# Patient Record
Sex: FEMALE | Race: WHITE | NOT HISPANIC OR LATINO | Employment: FULL TIME | RURAL
[De-identification: names, ages, dates, MRNs, and addresses within clinical notes are randomized per-mention and may not be internally consistent; named-entity substitution may affect disease eponyms.]

---

## 2019-07-15 ENCOUNTER — HISTORICAL (OUTPATIENT)
Dept: ADMINISTRATIVE | Facility: HOSPITAL | Age: 19
End: 2019-07-15

## 2019-07-17 LAB
LAB AP GENERAL CAT - HISTORICAL: NORMAL
LAB AP INTERPRETATION/RESULT - HISTORICAL: NEGATIVE
LAB AP SPECIMEN ADEQUACY - HISTORICAL: NORMAL
LAB AP SPECIMEN SUBMITTED - HISTORICAL: NORMAL

## 2021-08-31 ENCOUNTER — OFFICE VISIT (OUTPATIENT)
Dept: FAMILY MEDICINE | Facility: CLINIC | Age: 21
End: 2021-08-31
Payer: COMMERCIAL

## 2021-08-31 VITALS
HEART RATE: 103 BPM | TEMPERATURE: 99 F | WEIGHT: 266 LBS | OXYGEN SATURATION: 96 % | BODY MASS INDEX: 45.41 KG/M2 | RESPIRATION RATE: 20 BRPM | SYSTOLIC BLOOD PRESSURE: 110 MMHG | HEIGHT: 64 IN | DIASTOLIC BLOOD PRESSURE: 70 MMHG

## 2021-08-31 DIAGNOSIS — R05.8 COUGH WITH EXPOSURE TO SEVERE ACUTE RESPIRATORY SYNDROME CORONAVIRUS 2 (SARS-COV-2): Primary | ICD-10-CM

## 2021-08-31 DIAGNOSIS — Z20.822 COUGH WITH EXPOSURE TO SEVERE ACUTE RESPIRATORY SYNDROME CORONAVIRUS 2 (SARS-COV-2): Primary | ICD-10-CM

## 2021-08-31 LAB
CTP QC/QA: YES
FLUAV AG NPH QL: NEGATIVE
FLUBV AG NPH QL: NEGATIVE
SARS-COV-2 AG RESP QL IA.RAPID: POSITIVE

## 2021-08-31 PROCEDURE — 1160F RVW MEDS BY RX/DR IN RCRD: CPT | Mod: CPTII,,, | Performed by: NURSE PRACTITIONER

## 2021-08-31 PROCEDURE — 1159F MED LIST DOCD IN RCRD: CPT | Mod: CPTII,,, | Performed by: NURSE PRACTITIONER

## 2021-08-31 PROCEDURE — 3074F SYST BP LT 130 MM HG: CPT | Mod: CPTII,,, | Performed by: NURSE PRACTITIONER

## 2021-08-31 PROCEDURE — 3078F PR MOST RECENT DIASTOLIC BLOOD PRESSURE < 80 MM HG: ICD-10-PCS | Mod: CPTII,,, | Performed by: NURSE PRACTITIONER

## 2021-08-31 PROCEDURE — 3074F PR MOST RECENT SYSTOLIC BLOOD PRESSURE < 130 MM HG: ICD-10-PCS | Mod: CPTII,,, | Performed by: NURSE PRACTITIONER

## 2021-08-31 PROCEDURE — 87428 SARSCOV & INF VIR A&B AG IA: CPT | Mod: QW,,, | Performed by: NURSE PRACTITIONER

## 2021-08-31 PROCEDURE — 99213 OFFICE O/P EST LOW 20 MIN: CPT | Mod: ,,, | Performed by: NURSE PRACTITIONER

## 2021-08-31 PROCEDURE — 3008F BODY MASS INDEX DOCD: CPT | Mod: CPTII,,, | Performed by: NURSE PRACTITIONER

## 2021-08-31 PROCEDURE — 87428 POCT SARS-COV2 (COVID) WITH FLU ANTIGEN: ICD-10-PCS | Mod: QW,,, | Performed by: NURSE PRACTITIONER

## 2021-08-31 PROCEDURE — 3078F DIAST BP <80 MM HG: CPT | Mod: CPTII,,, | Performed by: NURSE PRACTITIONER

## 2021-08-31 PROCEDURE — 1160F PR REVIEW ALL MEDS BY PRESCRIBER/CLIN PHARMACIST DOCUMENTED: ICD-10-PCS | Mod: CPTII,,, | Performed by: NURSE PRACTITIONER

## 2021-08-31 PROCEDURE — 1159F PR MEDICATION LIST DOCUMENTED IN MEDICAL RECORD: ICD-10-PCS | Mod: CPTII,,, | Performed by: NURSE PRACTITIONER

## 2021-08-31 PROCEDURE — 3008F PR BODY MASS INDEX (BMI) DOCUMENTED: ICD-10-PCS | Mod: CPTII,,, | Performed by: NURSE PRACTITIONER

## 2021-08-31 PROCEDURE — 99213 PR OFFICE/OUTPT VISIT, EST, LEVL III, 20-29 MIN: ICD-10-PCS | Mod: ,,, | Performed by: NURSE PRACTITIONER

## 2021-09-03 ENCOUNTER — TELEPHONE (OUTPATIENT)
Dept: FAMILY MEDICINE | Facility: CLINIC | Age: 21
End: 2021-09-03

## 2021-09-03 DIAGNOSIS — J02.9 PHARYNGITIS, UNSPECIFIED ETIOLOGY: Primary | ICD-10-CM

## 2021-09-03 RX ORDER — AZITHROMYCIN 250 MG/1
TABLET, FILM COATED ORAL
Qty: 6 TABLET | Refills: 0 | Status: SHIPPED | OUTPATIENT
Start: 2021-09-03 | End: 2023-08-09

## 2023-08-09 ENCOUNTER — OFFICE VISIT (OUTPATIENT)
Dept: FAMILY MEDICINE | Facility: CLINIC | Age: 23
End: 2023-08-09
Payer: MEDICAID

## 2023-08-09 VITALS
HEART RATE: 85 BPM | HEIGHT: 64 IN | DIASTOLIC BLOOD PRESSURE: 80 MMHG | SYSTOLIC BLOOD PRESSURE: 122 MMHG | TEMPERATURE: 97 F | BODY MASS INDEX: 44.66 KG/M2 | WEIGHT: 261.63 LBS | OXYGEN SATURATION: 99 % | RESPIRATION RATE: 19 BRPM

## 2023-08-09 DIAGNOSIS — M62.838 NECK MUSCLE SPASM: Primary | ICD-10-CM

## 2023-08-09 PROCEDURE — 99213 PR OFFICE/OUTPT VISIT, EST, LEVL III, 20-29 MIN: ICD-10-PCS | Mod: ,,, | Performed by: FAMILY MEDICINE

## 2023-08-09 PROCEDURE — 99213 OFFICE O/P EST LOW 20 MIN: CPT | Mod: ,,, | Performed by: FAMILY MEDICINE

## 2023-08-09 RX ORDER — TIZANIDINE 4 MG/1
TABLET ORAL
Qty: 60 TABLET | Refills: 2 | Status: SHIPPED | OUTPATIENT
Start: 2023-08-09

## 2023-08-09 RX ORDER — LISDEXAMFETAMINE DIMESYLATE 60 MG/1
60 CAPSULE ORAL EVERY MORNING
COMMUNITY

## 2023-08-09 NOTE — PROGRESS NOTES
Subjective     Patient ID: Jaida Campos is a 23 y.o. female.    Chief Complaint: neck spasms (On the right side of neck since yesterday. Cant move neck.)    Recurrent problem.  Patient has never been this bad.  Patient says she was doing a little work and had the sudden onset of pain in right side of her neck.  No radicular symptoms.  No fever.    Review of Systems       Objective     Physical Exam  Constitutional:       General: She is in acute distress.      Appearance: She is not ill-appearing.   Cardiovascular:      Rate and Rhythm: Normal rate and regular rhythm.   Musculoskeletal:      Cervical back: Spasms and tenderness present. No swelling. Normal range of motion.        Back:       Comments: Well localized tenderness in area noted above   Skin:     Findings: No lesion or rash.   Neurological:      Mental Status: She is alert.          Assessment and Plan     1. Neck muscle spasm    Other orders  -     tiZANidine (ZANAFLEX) 4 MG tablet; 1 or 2 3 times a day as needed for muscle spasm  Dispense: 60 tablet; Refill: 2        Tizanidine.  Call for any new symptoms.         No follow-ups on file.

## 2023-08-09 NOTE — LETTER
August 9, 2023      [facility]  [address] [facility] [place] [address]  Phone: [phone]  Fax: [phone]       Patient: Jaida Campos   YOB: 2000  Date of Visit: 08/09/2023    To Whom It May Concern:    JADE Campos  was at [facility] on 08/09/2023. The patient may return to work/school on 08/11/2023 with no restrictions. If you have any questions or concerns, or if I can be of further assistance, please do not hesitate to contact me.    Sincerely,    Kalyani Ness LPN

## 2023-10-04 ENCOUNTER — OFFICE VISIT (OUTPATIENT)
Dept: OBSTETRICS AND GYNECOLOGY | Facility: CLINIC | Age: 23
End: 2023-10-04
Payer: MEDICAID

## 2023-10-04 VITALS
HEIGHT: 64 IN | DIASTOLIC BLOOD PRESSURE: 70 MMHG | BODY MASS INDEX: 45.24 KG/M2 | WEIGHT: 265 LBS | TEMPERATURE: 98 F | HEART RATE: 97 BPM | SYSTOLIC BLOOD PRESSURE: 122 MMHG | RESPIRATION RATE: 17 BRPM

## 2023-10-04 DIAGNOSIS — Z34.01 ENCOUNTER FOR SUPERVISION OF NORMAL FIRST PREGNANCY IN FIRST TRIMESTER: Primary | ICD-10-CM

## 2023-10-04 DIAGNOSIS — Z3A.13 13 WEEKS GESTATION OF PREGNANCY: ICD-10-CM

## 2023-10-04 LAB
BILIRUB SERPL-MCNC: NORMAL MG/DL
BLOOD URINE, POC: NORMAL
COLOR, POC UA: NORMAL
GLUCOSE UR QL STRIP: NORMAL
KETONES UR QL STRIP: NORMAL
LEUKOCYTE ESTERASE URINE, POC: NORMAL
NITRITE, POC UA: NORMAL
PH, POC UA: 6
PROTEIN, POC: NORMAL
SPECIFIC GRAVITY, POC UA: 1.02
UROBILINOGEN, POC UA: 0.2

## 2023-10-04 PROCEDURE — 80307 DRUG TEST PRSMV CHEM ANLYZR: CPT | Mod: 90,,, | Performed by: CLINICAL MEDICAL LABORATORY

## 2023-10-04 PROCEDURE — 3078F DIAST BP <80 MM HG: CPT | Mod: CPTII,,, | Performed by: STUDENT IN AN ORGANIZED HEALTH CARE EDUCATION/TRAINING PROGRAM

## 2023-10-04 PROCEDURE — 99214 OFFICE O/P EST MOD 30 MIN: CPT | Mod: PBBFAC | Performed by: STUDENT IN AN ORGANIZED HEALTH CARE EDUCATION/TRAINING PROGRAM

## 2023-10-04 PROCEDURE — 87591 CHLAMYDIA/GONORRHOEAE(GC), PCR: ICD-10-PCS | Mod: ,,, | Performed by: CLINICAL MEDICAL LABORATORY

## 2023-10-04 PROCEDURE — 3008F PR BODY MASS INDEX (BMI) DOCUMENTED: ICD-10-PCS | Mod: CPTII,,, | Performed by: STUDENT IN AN ORGANIZED HEALTH CARE EDUCATION/TRAINING PROGRAM

## 2023-10-04 PROCEDURE — 87591 N.GONORRHOEAE DNA AMP PROB: CPT | Mod: ,,, | Performed by: CLINICAL MEDICAL LABORATORY

## 2023-10-04 PROCEDURE — 87491 CHLMYD TRACH DNA AMP PROBE: CPT | Mod: ,,, | Performed by: CLINICAL MEDICAL LABORATORY

## 2023-10-04 PROCEDURE — G0480 PR DRUG TEST DEF 1-7 CLASSES: ICD-10-PCS | Mod: 90,59,, | Performed by: CLINICAL MEDICAL LABORATORY

## 2023-10-04 PROCEDURE — 81003 URINALYSIS AUTO W/O SCOPE: CPT | Mod: PBBFAC | Performed by: STUDENT IN AN ORGANIZED HEALTH CARE EDUCATION/TRAINING PROGRAM

## 2023-10-04 PROCEDURE — 87186 CULTURE, URINE: ICD-10-PCS | Mod: ,,, | Performed by: CLINICAL MEDICAL LABORATORY

## 2023-10-04 PROCEDURE — 99204 OFFICE O/P NEW MOD 45 MIN: CPT | Mod: S$PBB,TH,, | Performed by: STUDENT IN AN ORGANIZED HEALTH CARE EDUCATION/TRAINING PROGRAM

## 2023-10-04 PROCEDURE — 99204 PR OFFICE/OUTPT VISIT, NEW, LEVL IV, 45-59 MIN: ICD-10-PCS | Mod: S$PBB,TH,, | Performed by: STUDENT IN AN ORGANIZED HEALTH CARE EDUCATION/TRAINING PROGRAM

## 2023-10-04 PROCEDURE — 3074F PR MOST RECENT SYSTOLIC BLOOD PRESSURE < 130 MM HG: ICD-10-PCS | Mod: CPTII,,, | Performed by: STUDENT IN AN ORGANIZED HEALTH CARE EDUCATION/TRAINING PROGRAM

## 2023-10-04 PROCEDURE — 87661 TRICHOMONAS VAGINALIS AMPLIF: CPT | Mod: ,,, | Performed by: CLINICAL MEDICAL LABORATORY

## 2023-10-04 PROCEDURE — 99999PBSHW POCT URINALYSIS W/O SCOPE: ICD-10-PCS | Mod: PBBFAC,,,

## 2023-10-04 PROCEDURE — 87077 CULTURE, URINE: ICD-10-PCS | Mod: ,,, | Performed by: CLINICAL MEDICAL LABORATORY

## 2023-10-04 PROCEDURE — 87661 TRICHOMONAS VAGINALIS BY PCR: ICD-10-PCS | Mod: ,,, | Performed by: CLINICAL MEDICAL LABORATORY

## 2023-10-04 PROCEDURE — G0480 DRUG TEST DEF 1-7 CLASSES: HCPCS | Mod: 90,59,, | Performed by: CLINICAL MEDICAL LABORATORY

## 2023-10-04 PROCEDURE — 1159F MED LIST DOCD IN RCRD: CPT | Mod: CPTII,,, | Performed by: STUDENT IN AN ORGANIZED HEALTH CARE EDUCATION/TRAINING PROGRAM

## 2023-10-04 PROCEDURE — 87077 CULTURE AEROBIC IDENTIFY: CPT | Mod: ,,, | Performed by: CLINICAL MEDICAL LABORATORY

## 2023-10-04 PROCEDURE — 3008F BODY MASS INDEX DOCD: CPT | Mod: CPTII,,, | Performed by: STUDENT IN AN ORGANIZED HEALTH CARE EDUCATION/TRAINING PROGRAM

## 2023-10-04 PROCEDURE — 3078F PR MOST RECENT DIASTOLIC BLOOD PRESSURE < 80 MM HG: ICD-10-PCS | Mod: CPTII,,, | Performed by: STUDENT IN AN ORGANIZED HEALTH CARE EDUCATION/TRAINING PROGRAM

## 2023-10-04 PROCEDURE — 80307 MAYO GENERIC ORDERABLE: ICD-10-PCS | Mod: 90,,, | Performed by: CLINICAL MEDICAL LABORATORY

## 2023-10-04 PROCEDURE — 3074F SYST BP LT 130 MM HG: CPT | Mod: CPTII,,, | Performed by: STUDENT IN AN ORGANIZED HEALTH CARE EDUCATION/TRAINING PROGRAM

## 2023-10-04 PROCEDURE — 99999PBSHW POCT URINALYSIS W/O SCOPE: Mod: PBBFAC,,,

## 2023-10-04 PROCEDURE — 87086 CULTURE, URINE: ICD-10-PCS | Mod: ,,, | Performed by: CLINICAL MEDICAL LABORATORY

## 2023-10-04 PROCEDURE — 87186 SC STD MICRODIL/AGAR DIL: CPT | Mod: ,,, | Performed by: CLINICAL MEDICAL LABORATORY

## 2023-10-04 PROCEDURE — 87491 CHLAMYDIA/GONORRHOEAE(GC), PCR: ICD-10-PCS | Mod: ,,, | Performed by: CLINICAL MEDICAL LABORATORY

## 2023-10-04 PROCEDURE — 87086 URINE CULTURE/COLONY COUNT: CPT | Mod: ,,, | Performed by: CLINICAL MEDICAL LABORATORY

## 2023-10-04 PROCEDURE — 1159F PR MEDICATION LIST DOCUMENTED IN MEDICAL RECORD: ICD-10-PCS | Mod: CPTII,,, | Performed by: STUDENT IN AN ORGANIZED HEALTH CARE EDUCATION/TRAINING PROGRAM

## 2023-10-04 RX ORDER — TERCONAZOLE 8 MG/G
1 CREAM VAGINAL NIGHTLY
Qty: 20 G | Refills: 0 | Status: SHIPPED | OUTPATIENT
Start: 2023-10-04

## 2023-10-04 NOTE — PROGRESS NOTES
CC: Positive Pregnancy Test    HISTORY OF PRESENT ILLNESS:    Jaida Zacarias is a 23 y.o. female, ,  Presents today for a routine exam complaining of amenorrhea and positive home urine pregnancy test.  Patient's last menstrual period was 2023.   She is not currently on any contraception.  Reports nausea. Reports breast tenderness. Denies vaginal bleeding and pelvic pain.      Review of patient's allergies indicates:  No Known Allergies    History reviewed. No pertinent past medical history.  History reviewed. No pertinent surgical history.  OB History          1    Para        Term                AB        Living             SAB        IAB        Ectopic        Multiple        Live Births                   Family History   Problem Relation Age of Onset    Stroke Maternal Grandfather     Hypertension Mother      Social History     Tobacco Use    Smoking status: Never    Smokeless tobacco: Never   Substance Use Topics    Alcohol use: Never    Drug use: Never       Current Outpatient Medications   Medication Sig    lisdexamfetamine (VYVANSE) 60 MG capsule Take 60 mg by mouth every morning.    terconazole (TERAZOL 3) 0.8 % vaginal cream Place 1 applicator vaginally every evening.    tiZANidine (ZANAFLEX) 4 MG tablet 1 or 2 3 times a day as needed for muscle spasm     No current facility-administered medications for this visit.       OB History    Para Term  AB Living   1             SAB IAB Ectopic Multiple Live Births                  # Outcome Date GA Lbr Saad/2nd Weight Sex Delivery Anes PTL Lv   1 Current                   ROS:  GENERAL: No weight changes. No swelling. No fatigue. No fever.  CARDIOVASCULAR: No chest pain. No shortness of breath. No leg cramps.   NEUROLOGICAL: No headaches. No vision changes.  BREASTS: No pain. No lumps. No discharge.  ABDOMEN: No pain. No diarrhea. No constipation.  REPRODUCTIVE: No abnormal bleeding.   VULVA: No pain. No lesions. No  "itching.  VAGINA: No relaxation. No itching. No odor. No discharge. No lesions.  URINARY: No incontinence. No nocturia. No frequency. No dysuria.    /70   Pulse 97   Temp 98.4 °F (36.9 °C)   Resp 17   Ht 5' 4" (1.626 m)   Wt 120.2 kg (265 lb)   LMP 06/29/2023   BMI 45.49 kg/m²     PE:  AFFECT: Calm, alert and oriented X 3. Interactive during exam  GENERAL: Appears well-nourished, well-developed, in no acute distress.  HEAD: Normocephalic, atruamatic  SKIN: Normal for race, warm, & dry. No lesions or rashes.  LUNGS: Easy and unlabored  ABDOMEN: Soft and nontender without masses or organomegally.  EXTREMITIES: No cyanosis, clubbing or edema. No calf tenderness.  LYMPH NODES: No axillary or inguinal adenopathy.      ASSESSMENT:       ICD-10-CM ICD-9-CM    1. Encounter for supervision of normal first pregnancy in first trimester  Z34.01 V22.0       2. 13 weeks gestation of pregnancy  Z3A.13 V22.2 CBC Auto Differential      Basic Metabolic Panel      Hepatitis B Surface Antigen      Rubella Antibody Screen      Treponema Pallidum (Syphillis) Antibody      Urine culture      POCT URINALYSIS W/O SCOPE      Type & Screen      HIV 1/2 Ag/Ab (4th Gen)      Chlamydia/GC, PCR      Hepatitis C Antibody      Trichomonas vaginalis by PCR      US OB <14 Wks, TransAbd, Single Gestation      [facility]: Controlled Substance Monitoring Panel, Random, Urine      CANCELED: OB Drug Screen Definitive, Urine             Plan:     Amenorrhea  Positive urine pregnancy test (SHERYL: 4/4/2024, EGA: 13 weeks based on LMP)    -  Routine prenatal care    Nausea and vomiting in pregnancy    -  Education regarding lifestyle and dietary modifications    -  Advised use of B6/Unisom. Pt will notify us if no relief/worsening symptoms, will consider Zofran if needed.      1st TRIMESTER COUNSELING: Discussed all, booklet provided:  Common complaints of pregnancy  HIV and other routine prenatal tests including "  genetic screening  Risk factors identified by prenatal history  Oriented to practice - discussed anticipated course of prenatal care & indications for Ultrasound  Childbirth classes/Hospital facilities   Nutrition and weight gain counseling  Toxoplasmosis precautions (Cats/Raw Meat)  Sexual activity and exercise  Environmental/Work hazards  Travel  Tobacco (Ask, Advise, Assess, Assist, and Arrange), as well as alcohol and drug use  Use of any medications (Including supplements, Vitamins, Herbs, or OTC Drugs)  Domestic violence  Seat belt use      TERATOLOGY COUNSELING:   Discussed indications and options for aneuploidy screening - pamphlets given    -  Pt desires Dolores testing.  Dating US ordered  Follow up in about 4 weeks (around 2023) for NEW OB.    David Rosado  OB/GYN

## 2023-10-05 ENCOUNTER — PATIENT MESSAGE (OUTPATIENT)
Dept: OBSTETRICS AND GYNECOLOGY | Facility: CLINIC | Age: 23
End: 2023-10-05
Payer: MEDICAID

## 2023-10-05 LAB
CHLAMYDIA BY PCR: NEGATIVE
N. GONORRHOEAE (GC) BY PCR: NEGATIVE
TRICHOMONAS NAT: NEGATIVE

## 2023-10-05 RX ORDER — CEPHALEXIN 500 MG/1
500 CAPSULE ORAL 4 TIMES DAILY
Qty: 28 CAPSULE | Refills: 0 | Status: SHIPPED | OUTPATIENT
Start: 2023-10-05 | End: 2023-10-12

## 2023-10-06 LAB — UA COMPLETE W REFLEX CULTURE PNL UR: ABNORMAL

## 2023-10-11 LAB — MAYO GENERIC ORDERABLE RESULT: NORMAL

## 2023-10-17 ENCOUNTER — HOSPITAL ENCOUNTER (OUTPATIENT)
Dept: RADIOLOGY | Facility: HOSPITAL | Age: 23
Discharge: HOME | End: 2023-10-17
Attending: STUDENT IN AN ORGANIZED HEALTH CARE EDUCATION/TRAINING PROGRAM
Payer: MEDICAID

## 2023-10-17 DIAGNOSIS — Z3A.13 13 WEEKS GESTATION OF PREGNANCY: ICD-10-CM

## 2023-10-17 PROCEDURE — 76801 OB US < 14 WKS SINGLE FETUS: CPT | Mod: 26,,, | Performed by: RADIOLOGY

## 2023-10-17 PROCEDURE — 76801 US OB <14 WEEKS TRANSABDOM, SINGLE GESTATION: ICD-10-PCS | Mod: 26,,, | Performed by: RADIOLOGY

## 2023-10-17 PROCEDURE — 76801 OB US < 14 WKS SINGLE FETUS: CPT | Mod: TC

## 2023-11-02 ENCOUNTER — ROUTINE PRENATAL (OUTPATIENT)
Dept: OBSTETRICS AND GYNECOLOGY | Facility: CLINIC | Age: 23
End: 2023-11-02
Payer: MEDICAID

## 2023-11-02 VITALS
WEIGHT: 270 LBS | DIASTOLIC BLOOD PRESSURE: 89 MMHG | BODY MASS INDEX: 46.35 KG/M2 | HEART RATE: 101 BPM | SYSTOLIC BLOOD PRESSURE: 135 MMHG

## 2023-11-02 DIAGNOSIS — O23.42 URINARY TRACT INFECTION IN MOTHER DURING SECOND TRIMESTER OF PREGNANCY: Primary | ICD-10-CM

## 2023-11-02 DIAGNOSIS — Z36.89 ENCOUNTER FOR FETAL ANATOMIC SURVEY: ICD-10-CM

## 2023-11-02 DIAGNOSIS — Z3A.18 18 WEEKS GESTATION OF PREGNANCY: ICD-10-CM

## 2023-11-02 LAB
BILIRUB SERPL-MCNC: NORMAL MG/DL
BLOOD URINE, POC: NORMAL
COLOR, POC UA: NORMAL
GLUCOSE UR QL STRIP: NORMAL
KETONES UR QL STRIP: NORMAL
LEUKOCYTE ESTERASE URINE, POC: NORMAL
NITRITE, POC UA: NORMAL
PH, POC UA: 6.5
PROTEIN, POC: NORMAL
SPECIFIC GRAVITY, POC UA: 1.02
UROBILINOGEN, POC UA: 0.2

## 2023-11-02 PROCEDURE — 99999PBSHW POCT URINALYSIS W/O SCOPE: Mod: PBBFAC,,,

## 2023-11-02 PROCEDURE — 99999PBSHW POCT URINALYSIS W/O SCOPE: ICD-10-PCS | Mod: PBBFAC,,,

## 2023-11-02 PROCEDURE — 99213 PR OFFICE/OUTPT VISIT, EST, LEVL III, 20-29 MIN: ICD-10-PCS | Mod: TH,S$PBB,, | Performed by: STUDENT IN AN ORGANIZED HEALTH CARE EDUCATION/TRAINING PROGRAM

## 2023-11-02 PROCEDURE — 99213 OFFICE O/P EST LOW 20 MIN: CPT | Mod: PBBFAC | Performed by: STUDENT IN AN ORGANIZED HEALTH CARE EDUCATION/TRAINING PROGRAM

## 2023-11-02 PROCEDURE — 81003 URINALYSIS AUTO W/O SCOPE: CPT | Mod: PBBFAC | Performed by: STUDENT IN AN ORGANIZED HEALTH CARE EDUCATION/TRAINING PROGRAM

## 2023-11-02 PROCEDURE — 99213 OFFICE O/P EST LOW 20 MIN: CPT | Mod: TH,S$PBB,, | Performed by: STUDENT IN AN ORGANIZED HEALTH CARE EDUCATION/TRAINING PROGRAM

## 2023-11-29 ENCOUNTER — ROUTINE PRENATAL (OUTPATIENT)
Dept: OBSTETRICS AND GYNECOLOGY | Facility: CLINIC | Age: 23
End: 2023-11-29
Payer: MEDICAID

## 2023-11-29 ENCOUNTER — HOSPITAL ENCOUNTER (OUTPATIENT)
Dept: RADIOLOGY | Facility: HOSPITAL | Age: 23
Discharge: HOME | End: 2023-11-29
Attending: STUDENT IN AN ORGANIZED HEALTH CARE EDUCATION/TRAINING PROGRAM
Payer: MEDICAID

## 2023-11-29 VITALS
WEIGHT: 274 LBS | SYSTOLIC BLOOD PRESSURE: 124 MMHG | HEART RATE: 89 BPM | BODY MASS INDEX: 47.03 KG/M2 | DIASTOLIC BLOOD PRESSURE: 85 MMHG

## 2023-11-29 DIAGNOSIS — R35.0 FREQUENCY OF URINATION: ICD-10-CM

## 2023-11-29 DIAGNOSIS — Z3A.21 21 WEEKS GESTATION OF PREGNANCY: ICD-10-CM

## 2023-11-29 DIAGNOSIS — Z36.89 ENCOUNTER FOR FETAL ANATOMIC SURVEY: ICD-10-CM

## 2023-11-29 DIAGNOSIS — O23.42 URINARY TRACT INFECTION IN MOTHER DURING SECOND TRIMESTER OF PREGNANCY: Primary | ICD-10-CM

## 2023-11-29 PROCEDURE — 99214 OFFICE O/P EST MOD 30 MIN: CPT | Mod: S$PBB,TH,, | Performed by: STUDENT IN AN ORGANIZED HEALTH CARE EDUCATION/TRAINING PROGRAM

## 2023-11-29 PROCEDURE — 76805 US OB 14+ WEEKS, TRANSABDOM, SINGLE GESTATION: ICD-10-PCS | Mod: 26,,, | Performed by: RADIOLOGY

## 2023-11-29 PROCEDURE — 99999PBSHW POCT URINALYSIS W/O SCOPE: ICD-10-PCS | Mod: PBBFAC,,,

## 2023-11-29 PROCEDURE — 76805 OB US >/= 14 WKS SNGL FETUS: CPT | Mod: TC

## 2023-11-29 PROCEDURE — 99214 PR OFFICE/OUTPT VISIT, EST, LEVL IV, 30-39 MIN: ICD-10-PCS | Mod: S$PBB,TH,, | Performed by: STUDENT IN AN ORGANIZED HEALTH CARE EDUCATION/TRAINING PROGRAM

## 2023-11-29 PROCEDURE — 87086 CULTURE, URINE: ICD-10-PCS | Mod: ,,, | Performed by: CLINICAL MEDICAL LABORATORY

## 2023-11-29 PROCEDURE — 87086 URINE CULTURE/COLONY COUNT: CPT | Mod: ,,, | Performed by: CLINICAL MEDICAL LABORATORY

## 2023-11-29 PROCEDURE — 76805 OB US >/= 14 WKS SNGL FETUS: CPT | Mod: 26,,, | Performed by: RADIOLOGY

## 2023-11-29 PROCEDURE — 81003 URINALYSIS AUTO W/O SCOPE: CPT | Mod: PBBFAC | Performed by: STUDENT IN AN ORGANIZED HEALTH CARE EDUCATION/TRAINING PROGRAM

## 2023-11-29 PROCEDURE — 99213 OFFICE O/P EST LOW 20 MIN: CPT | Mod: PBBFAC,25 | Performed by: STUDENT IN AN ORGANIZED HEALTH CARE EDUCATION/TRAINING PROGRAM

## 2023-11-29 PROCEDURE — 99999PBSHW POCT URINALYSIS W/O SCOPE: Mod: PBBFAC,,,

## 2023-11-29 NOTE — PROGRESS NOTES
Return OB Visit    23 y.o. female  at 18w6d   She denies any problems.   Reports some fetal movement or fluttering. Denies any vaginal bleeding, leakage of fluid, cramping, contractions, or pressure.   Total weight gain/weight loss in pregnancy: Not found.     Vitals  BP: 124/85  Pulse: 89  Weight: 124.3 kg (274 lb)  Prenatal  Fetal Heart Rate: 163  Movement: Present    Prenatal Labs:  Lab Results   Component Value Date    GROUPTRH A POS 10/04/2023    HGB 13.6 10/04/2023    HCT 39.7 10/04/2023     10/04/2023    HEPBSAG Non-Reactive 10/04/2023    [id number] Non-Reactive 10/04/2023    LABNGO Negative 10/04/2023    LABURIN >100,000 Escherichia coli (A) 10/04/2023       A: 18w6d           ICD-10-CM ICD-9-CM    1. Urinary tract infection in mother during second trimester of pregnancy  O23.42 646.63 Urine culture     599.0       2. Frequency of urination  R35.0 788.41 POCT URINALYSIS W/O SCOPE      3. 21 weeks gestation of pregnancy  Z3A.21 V22.2           P: Bleeding, daily fetal kick counts, and  labor/labor precautions discussed.    The following were addressed during this visit:    17-20 Weeks  - Quickening   - Lifestyle   - Ultrasound   - Importance of Early and Frequent Breastfeeding   - Baby-led Feeding   - Frequent feeding to help assure optimal milk production       Orders Placed This Encounter   Procedures    Urine culture    POCT URINALYSIS W/O SCOPE       Questions answered to desired level of satisfaction  Verbalized understanding to all information and instructions provided.  Follow up in about 4 weeks (around 2023) for OBV.    Alyse Boswell, DO OBGYN [facility]

## 2023-12-01 LAB — UA COMPLETE W REFLEX CULTURE PNL UR: NORMAL

## 2023-12-28 ENCOUNTER — ROUTINE PRENATAL (OUTPATIENT)
Dept: OBSTETRICS AND GYNECOLOGY | Facility: CLINIC | Age: 23
End: 2023-12-28
Payer: MEDICAID

## 2023-12-28 VITALS
DIASTOLIC BLOOD PRESSURE: 80 MMHG | BODY MASS INDEX: 48.41 KG/M2 | SYSTOLIC BLOOD PRESSURE: 120 MMHG | WEIGHT: 282 LBS | HEART RATE: 95 BPM

## 2023-12-28 DIAGNOSIS — R35.0 FREQUENCY OF URINATION: ICD-10-CM

## 2023-12-28 DIAGNOSIS — Z34.02 ENCOUNTER FOR SUPERVISION OF NORMAL FIRST PREGNANCY IN SECOND TRIMESTER: Primary | ICD-10-CM

## 2023-12-28 DIAGNOSIS — Z3A.23 23 WEEKS GESTATION OF PREGNANCY: ICD-10-CM

## 2023-12-28 LAB
BILIRUB SERPL-MCNC: NORMAL MG/DL
BLOOD URINE, POC: NORMAL
COLOR, POC UA: NORMAL
GLUCOSE UR QL STRIP: NORMAL
KETONES UR QL STRIP: NORMAL
LEUKOCYTE ESTERASE URINE, POC: NORMAL
NITRITE, POC UA: NORMAL
PH, POC UA: 5
PROTEIN, POC: NORMAL
SPECIFIC GRAVITY, POC UA: 1.02
UROBILINOGEN, POC UA: NORMAL

## 2023-12-28 PROCEDURE — 59425 ANTEPARTUM CARE ONLY: CPT | Mod: S$PBB,TH,, | Performed by: ADVANCED PRACTICE MIDWIFE

## 2023-12-28 PROCEDURE — 99213 OFFICE O/P EST LOW 20 MIN: CPT | Mod: PBBFAC | Performed by: ADVANCED PRACTICE MIDWIFE

## 2023-12-28 PROCEDURE — 59425 PR ANTEPARTUM CARE ONLY, 4-6 VISITS: ICD-10-PCS | Mod: S$PBB,TH,, | Performed by: ADVANCED PRACTICE MIDWIFE

## 2023-12-28 PROCEDURE — 99999PBSHW POCT URINALYSIS W/O SCOPE: Mod: PBBFAC,,,

## 2023-12-28 PROCEDURE — 81003 URINALYSIS AUTO W/O SCOPE: CPT | Mod: PBBFAC | Performed by: ADVANCED PRACTICE MIDWIFE

## 2023-12-28 PROCEDURE — 99999PBSHW POCT URINALYSIS W/O SCOPE: ICD-10-PCS | Mod: PBBFAC,,,

## 2023-12-28 NOTE — PROGRESS NOTES
23 y.o. female  at 23w0d   She c/o had stomach bug last week .   Denies any vaginal bleeding, leakage of fluid, cramping, contractions, or pressure.   Total weight gain/weight loss in pregnancy: 7.711 kg (17 lb)     Vitals  BP: 120/80  Pulse: 95  Weight: 127.9 kg (282 lb)  Prenatal  Fetal Heart Rate: 140  Movement: Present  Edema  LLE Edema: None  RLE Edema: None  Facial: None  Additional Edema?: No    Prenatal Labs:  Lab Results   Component Value Date    GROUPTRH A POS 10/04/2023    HGB 13.6 10/04/2023    HCT 39.7 10/04/2023     10/04/2023    HEPBSAG Non-Reactive 10/04/2023    [id number] Non-Reactive 10/04/2023    LABNGO Negative 10/04/2023    LABURIN Skin/Urogenital Teetee Isolated, no further workup. 2023       A: 23w0d           ICD-10-CM ICD-9-CM    1. Encounter for supervision of normal first pregnancy in second trimester  Z34.02 V22.0 POCT URINALYSIS W/O SCOPE      Treponema Pallidum (Syphillis) Antibody      CBC Auto Differential      Glucose, 1Hr Post Prandial      2. 23 weeks gestation of pregnancy  Z3A.23 V22.2 POCT URINALYSIS W/O SCOPE      3. Frequency of urination  R35.0 788.41 POCT URINALYSIS W/O SCOPE          P: Bleeding and  labor/labor precautions discussed.      Questions answered to desired level of satisfaction  Verbalized understanding to all information and instructions provided.  Follow up in about 4 weeks (around 2024) for OBV, 1hr GTT.    Mirlande Azul CNM, WHNP-BC

## 2024-01-23 ENCOUNTER — ROUTINE PRENATAL (OUTPATIENT)
Dept: OBSTETRICS AND GYNECOLOGY | Facility: CLINIC | Age: 24
End: 2024-01-23
Payer: MEDICAID

## 2024-01-23 VITALS
WEIGHT: 293 LBS | SYSTOLIC BLOOD PRESSURE: 128 MMHG | DIASTOLIC BLOOD PRESSURE: 80 MMHG | BODY MASS INDEX: 50.81 KG/M2 | HEART RATE: 90 BPM

## 2024-01-23 DIAGNOSIS — Z3A.26 26 WEEKS GESTATION OF PREGNANCY: ICD-10-CM

## 2024-01-23 DIAGNOSIS — Z34.02 ENCOUNTER FOR SUPERVISION OF NORMAL FIRST PREGNANCY IN SECOND TRIMESTER: Primary | ICD-10-CM

## 2024-01-23 DIAGNOSIS — R35.0 FREQUENCY OF URINATION: ICD-10-CM

## 2024-01-23 LAB
BILIRUB SERPL-MCNC: NORMAL MG/DL
BLOOD URINE, POC: NORMAL
COLOR, POC UA: YELLOW
GLUCOSE UR QL STRIP: NORMAL
KETONES UR QL STRIP: NORMAL
LEUKOCYTE ESTERASE URINE, POC: NORMAL
NITRITE, POC UA: NORMAL
PH, POC UA: 5
PROTEIN, POC: NORMAL
SPECIFIC GRAVITY, POC UA: 1.03
UROBILINOGEN, POC UA: NORMAL

## 2024-01-23 PROCEDURE — 99213 OFFICE O/P EST LOW 20 MIN: CPT | Mod: PBBFAC | Performed by: ADVANCED PRACTICE MIDWIFE

## 2024-01-23 PROCEDURE — 99999PBSHW POCT URINALYSIS W/O SCOPE: Mod: PBBFAC,,,

## 2024-01-23 PROCEDURE — 81003 URINALYSIS AUTO W/O SCOPE: CPT | Mod: PBBFAC | Performed by: ADVANCED PRACTICE MIDWIFE

## 2024-01-23 PROCEDURE — 59425 ANTEPARTUM CARE ONLY: CPT | Mod: S$PBB,TH,, | Performed by: ADVANCED PRACTICE MIDWIFE

## 2024-01-23 NOTE — PROGRESS NOTES
23 y.o. female  at 26w5d   She denies any complaints. 1hr GTT today.  Reports good fetal movement or fluttering. Denies any vaginal bleeding, leakage of fluid, cramping, contractions, or pressure.   Total weight gain/weight loss in pregnancy: 14.1 kg (31 lb)     Vitals  BP: 128/80  Pulse: 90  Weight: 134.3 kg (296 lb)  Prenatal  Fetal Heart Rate: 130  Movement: Present  Edema  LLE Edema: None  RLE Edema: None  Facial: None  Additional Edema?: No    Prenatal Labs:  Lab Results   Component Value Date    GROUPTRH A POS 10/04/2023    HGB 12.6 2024    HCT 36.1 (L) 2024     2024    HEPBSAG Non-Reactive 10/04/2023    [id number] Non-Reactive 10/04/2023    LABNGO Negative 10/04/2023    LABURIN Skin/Urogenital Teetee Isolated, no further workup. 2023       A: 26w5d           ICD-10-CM ICD-9-CM    1. Encounter for supervision of normal first pregnancy in second trimester  Z34.02 V22.0 US OB Follow-up Ea Gestation Transabd      2. 26 weeks gestation of pregnancy  Z3A.26 V22.2 POCT URINALYSIS W/O SCOPE      3. Frequency of urination  R35.0 788.41 POCT URINALYSIS W/O SCOPE          P: Bleeding, daily fetal kick counts, and  labor/labor precautions discussed.      Questions answered to desired level of satisfaction  Verbalized understanding to all information and instructions provided.  Follow up in about 3 weeks (around 2024) for OBV, Ultrasound.    Mirlande Azul CNM, WILL-BC

## 2024-02-13 ENCOUNTER — HOSPITAL ENCOUNTER (OUTPATIENT)
Dept: RADIOLOGY | Facility: HOSPITAL | Age: 24
Discharge: HOME | End: 2024-02-13
Attending: ADVANCED PRACTICE MIDWIFE
Payer: MEDICAID

## 2024-02-13 ENCOUNTER — ROUTINE PRENATAL (OUTPATIENT)
Dept: OBSTETRICS AND GYNECOLOGY | Facility: CLINIC | Age: 24
End: 2024-02-13
Attending: ADVANCED PRACTICE MIDWIFE
Payer: MEDICAID

## 2024-02-13 VITALS
BODY MASS INDEX: 50.81 KG/M2 | HEART RATE: 98 BPM | SYSTOLIC BLOOD PRESSURE: 122 MMHG | DIASTOLIC BLOOD PRESSURE: 82 MMHG | WEIGHT: 293 LBS

## 2024-02-13 DIAGNOSIS — Z34.02 ENCOUNTER FOR SUPERVISION OF NORMAL FIRST PREGNANCY IN SECOND TRIMESTER: ICD-10-CM

## 2024-02-13 DIAGNOSIS — R35.0 FREQUENCY OF URINATION: ICD-10-CM

## 2024-02-13 DIAGNOSIS — Z34.03 ENCOUNTER FOR SUPERVISION OF NORMAL FIRST PREGNANCY IN THIRD TRIMESTER: Primary | ICD-10-CM

## 2024-02-13 DIAGNOSIS — Z3A.29 29 WEEKS GESTATION OF PREGNANCY: ICD-10-CM

## 2024-02-13 LAB
BILIRUB SERPL-MCNC: NORMAL MG/DL
BLOOD URINE, POC: NORMAL
COLOR, POC UA: YELLOW
GLUCOSE UR QL STRIP: NORMAL
KETONES UR QL STRIP: NORMAL
LEUKOCYTE ESTERASE URINE, POC: NORMAL
NITRITE, POC UA: NORMAL
PH, POC UA: 6.5
PROTEIN, POC: NORMAL
SPECIFIC GRAVITY, POC UA: 1.02
UROBILINOGEN, POC UA: NORMAL

## 2024-02-13 PROCEDURE — 81003 URINALYSIS AUTO W/O SCOPE: CPT | Mod: PBBFAC | Performed by: ADVANCED PRACTICE MIDWIFE

## 2024-02-13 PROCEDURE — 76816 OB US FOLLOW-UP PER FETUS: CPT | Mod: TC

## 2024-02-13 PROCEDURE — 76816 OB US FOLLOW-UP PER FETUS: CPT | Mod: 26,,, | Performed by: RADIOLOGY

## 2024-02-13 PROCEDURE — 99999PBSHW POCT URINALYSIS W/O SCOPE: Mod: PBBFAC,,,

## 2024-02-13 PROCEDURE — 59425 ANTEPARTUM CARE ONLY: CPT | Mod: S$PBB,TH,, | Performed by: ADVANCED PRACTICE MIDWIFE

## 2024-02-13 PROCEDURE — 99213 OFFICE O/P EST LOW 20 MIN: CPT | Mod: PBBFAC,25 | Performed by: ADVANCED PRACTICE MIDWIFE

## 2024-02-13 NOTE — PROGRESS NOTES
Return OB Visit    23 y.o. female  at 29w5d   She denies any complaints.   Reports good fetal movement or fluttering. Denies any vaginal bleeding, leakage of fluid, cramping, contractions, or pressure.   Total weight gain/weight loss in pregnancy: 14.1 kg (31 lb)     Vitals  BP: 122/82  Pulse: 98  Weight: 134.3 kg (296 lb)  Prenatal  Fetal Heart Rate: 134  Movement: Present  Edema  LLE Edema: None  RLE Edema: None  Facial: None  Additional Edema?: No    Prenatal Labs:  Lab Results   Component Value Date    GROUPTRH A POS 10/04/2023    HGB 12.6 2024    HCT 36.1 (L) 2024     2024    HEPBSAG Non-Reactive 10/04/2023    [id number] Non-Reactive 10/04/2023    LABNGO Negative 10/04/2023    LABURIN Skin/Urogenital Teetee Isolated, no further workup. 2023       A: 29w5d           ICD-10-CM ICD-9-CM    1. Encounter for supervision of normal first pregnancy in third trimester  Z34.03 V22.0       2. 29 weeks gestation of pregnancy  Z3A.29 V22.2 POCT URINALYSIS W/O SCOPE      3. Frequency of urination  R35.0 788.41 POCT URINALYSIS W/O SCOPE      US today EFW: 3lb 9oz CRUZ: 15.4cm CL:3.6cm, vertex    No pregnancy checklist tasks were completed during this visit, and no tasks are pending completion.    -Benefits of breastfeeding   -Rooming In and Skin to Skin    P: Bleeding, daily fetal kick counts, and  labor/labor precautions discussed.    Questions answered to desired level of satisfaction  Verbalized understanding to all information and instructions provided.  Follow up in about 2 weeks (around 2024) for OBV.    Mirlande Azul CNM, WILL-BC

## 2024-02-27 ENCOUNTER — ROUTINE PRENATAL (OUTPATIENT)
Dept: OBSTETRICS AND GYNECOLOGY | Facility: CLINIC | Age: 24
End: 2024-02-27
Payer: MEDICAID

## 2024-02-27 VITALS
BODY MASS INDEX: 51.49 KG/M2 | HEART RATE: 101 BPM | WEIGHT: 293 LBS | DIASTOLIC BLOOD PRESSURE: 80 MMHG | SYSTOLIC BLOOD PRESSURE: 122 MMHG

## 2024-02-27 DIAGNOSIS — R35.0 FREQUENCY OF URINATION: ICD-10-CM

## 2024-02-27 DIAGNOSIS — Z3A.31 31 WEEKS GESTATION OF PREGNANCY: ICD-10-CM

## 2024-02-27 DIAGNOSIS — Z34.03 ENCOUNTER FOR SUPERVISION OF NORMAL FIRST PREGNANCY IN THIRD TRIMESTER: Primary | ICD-10-CM

## 2024-02-27 LAB
BILIRUB SERPL-MCNC: NORMAL MG/DL
BLOOD URINE, POC: NORMAL
COLOR, POC UA: NORMAL
GLUCOSE UR QL STRIP: NORMAL
KETONES UR QL STRIP: NORMAL
LEUKOCYTE ESTERASE URINE, POC: NORMAL
NITRITE, POC UA: NORMAL
PH, POC UA: 6
PROTEIN, POC: NORMAL
SPECIFIC GRAVITY, POC UA: 1.02
UROBILINOGEN, POC UA: NORMAL

## 2024-02-27 PROCEDURE — 99213 OFFICE O/P EST LOW 20 MIN: CPT | Mod: PBBFAC | Performed by: ADVANCED PRACTICE MIDWIFE

## 2024-02-27 PROCEDURE — 81003 URINALYSIS AUTO W/O SCOPE: CPT | Mod: PBBFAC | Performed by: ADVANCED PRACTICE MIDWIFE

## 2024-02-27 PROCEDURE — 99999PBSHW POCT URINALYSIS W/O SCOPE: Mod: PBBFAC,,,

## 2024-02-27 PROCEDURE — 59426 ANTEPARTUM CARE ONLY: CPT | Mod: S$PBB,TH,, | Performed by: ADVANCED PRACTICE MIDWIFE

## 2024-02-27 NOTE — PROGRESS NOTES
Return OB Visit    23 y.o. female  at 31w5d   She c/o occasional numbness on left hands and feet.   Reports good fetal movement or fluttering. Denies any vaginal bleeding, leakage of fluid, cramping, contractions, or pressure.   Total weight gain/weight loss in pregnancy: 15.9 kg (35 lb)     Vitals  BP: 122/80  Pulse: 101  Weight: 136.1 kg (300 lb)  Prenatal  Fetal Heart Rate: 130  Movement: Present  Edema  LLE Edema: None  RLE Edema: None  Facial: None  Additional Edema?: No    Prenatal Labs:  Lab Results   Component Value Date    GROUPTRH A POS 10/04/2023    HGB 12.6 2024    HCT 36.1 (L) 2024     2024    HEPBSAG Non-Reactive 10/04/2023    [id number] Non-Reactive 10/04/2023    LABNGO Negative 10/04/2023    LABURIN Skin/Urogenital Teetee Isolated, no further workup. 2023       A: 31w5d           ICD-10-CM ICD-9-CM    1. Encounter for supervision of normal first pregnancy in third trimester  Z34.03 V22.0       2. 31 weeks gestation of pregnancy  Z3A.31 V22.2 POCT URINALYSIS W/O SCOPE      3. Frequency of urination  R35.0 788.41 POCT URINALYSIS W/O SCOPE      4. Lactating mother  Z39.1 V24.1 BREAST PUMP FOR HOME USE          The following were addressed during this visit:    29-32 Weeks  - Contraception/Tubal Consent   - Pre-registration   - Circumsision plans   - Op note review/ consent   - Birth Plan   - Pediatrician   - Fetal Kick Counts/PIH/PTL precautions   - Preeclampsia Education   - Quiet time     -Benefits of breastfeeding   -Rooming In and Skin to Skin    P: Bleeding, daily fetal kick counts, and  labor/labor precautions discussed.    Questions answered to desired level of satisfaction  Verbalized understanding to all information and instructions provided.  Follow up in about 2 weeks (around 3/12/2024) for OBV.    Mirlande Azul CNM, WILL-BC

## 2024-03-12 ENCOUNTER — ROUTINE PRENATAL (OUTPATIENT)
Dept: OBSTETRICS AND GYNECOLOGY | Facility: CLINIC | Age: 24
End: 2024-03-12
Payer: MEDICAID

## 2024-03-12 VITALS
HEART RATE: 102 BPM | DIASTOLIC BLOOD PRESSURE: 78 MMHG | BODY MASS INDEX: 52.01 KG/M2 | WEIGHT: 293 LBS | SYSTOLIC BLOOD PRESSURE: 116 MMHG

## 2024-03-12 DIAGNOSIS — R35.0 FREQUENCY OF URINATION: ICD-10-CM

## 2024-03-12 DIAGNOSIS — Z34.03 ENCOUNTER FOR SUPERVISION OF NORMAL FIRST PREGNANCY IN THIRD TRIMESTER: Primary | ICD-10-CM

## 2024-03-12 DIAGNOSIS — Z3A.33 33 WEEKS GESTATION OF PREGNANCY: ICD-10-CM

## 2024-03-12 LAB
BILIRUB SERPL-MCNC: NORMAL MG/DL
BLOOD URINE, POC: NORMAL
COLOR, POC UA: NORMAL
GLUCOSE UR QL STRIP: NORMAL
KETONES UR QL STRIP: NORMAL
LEUKOCYTE ESTERASE URINE, POC: NORMAL
NITRITE, POC UA: NORMAL
PH, POC UA: 7.5
PROTEIN, POC: NORMAL
SPECIFIC GRAVITY, POC UA: 1.02
UROBILINOGEN, POC UA: NORMAL

## 2024-03-12 PROCEDURE — 99213 OFFICE O/P EST LOW 20 MIN: CPT | Mod: PBBFAC | Performed by: ADVANCED PRACTICE MIDWIFE

## 2024-03-12 PROCEDURE — 59426 ANTEPARTUM CARE ONLY: CPT | Mod: S$PBB,TH,, | Performed by: ADVANCED PRACTICE MIDWIFE

## 2024-03-12 PROCEDURE — 81003 URINALYSIS AUTO W/O SCOPE: CPT | Mod: PBBFAC | Performed by: ADVANCED PRACTICE MIDWIFE

## 2024-03-12 PROCEDURE — 99999PBSHW POCT URINALYSIS W/O SCOPE: Mod: PBBFAC,,,

## 2024-03-12 NOTE — PROGRESS NOTES
Return OB Visit    23 y.o. female  at 33w5d   She c/o pressure.   Reports good fetal movement or fluttering. Denies any vaginal bleeding, leakage of fluid, cramping or contractions.  Total weight gain/weight loss in pregnancy: 17.2 kg (38 lb)     Vitals  BP: 116/78  Pulse: 102  Weight: (!) 137.4 kg (303 lb)  Prenatal  Fetal Heart Rate: 150  Movement: Present  Edema  LLE Edema: Mild pitting, slight indentation  RLE Edema: Mild pitting, slight indentation  Facial: None  Additional Edema?: No    Prenatal Labs:  Lab Results   Component Value Date    GROUPTRH A POS 10/04/2023    HGB 12.6 2024    HCT 36.1 (L) 2024     2024    HEPBSAG Non-Reactive 10/04/2023    [id number] Non-Reactive 10/04/2023    LABNGO Negative 10/04/2023    LABURIN Skin/Urogenital Teetee Isolated, no further workup. 2023       A: 33w5d           ICD-10-CM ICD-9-CM    1. Encounter for supervision of normal first pregnancy in third trimester  Z34.03 V22.0 US OB Follow-up Ea Gestation Transabd      2. 33 weeks gestation of pregnancy  Z3A.33 V22.2 POCT URINALYSIS W/O SCOPE      3. Frequency of urination  R35.0 788.41 POCT URINALYSIS W/O SCOPE          No pregnancy checklist tasks were completed during this visit, and no tasks are pending completion.    -Benefits of breastfeeding   -Rooming In and Skin to Skin    P: Bleeding, daily fetal kick counts, and  labor/labor precautions discussed.    Questions answered to desired level of satisfaction  Verbalized understanding to all information and instructions provided.  Follow up in about 2 weeks (around 3/26/2024) for OBV, Ultrasound, GBS .    Mirlande Azul CNM, WILL-BC

## 2024-03-27 ENCOUNTER — HOSPITAL ENCOUNTER (OUTPATIENT)
Dept: RADIOLOGY | Facility: HOSPITAL | Age: 24
Discharge: HOME | End: 2024-03-27
Attending: ADVANCED PRACTICE MIDWIFE
Payer: MEDICAID

## 2024-03-27 ENCOUNTER — ROUTINE PRENATAL (OUTPATIENT)
Dept: OBSTETRICS AND GYNECOLOGY | Facility: CLINIC | Age: 24
End: 2024-03-27
Attending: ADVANCED PRACTICE MIDWIFE
Payer: MEDICAID

## 2024-03-27 VITALS
BODY MASS INDEX: 52.35 KG/M2 | HEART RATE: 102 BPM | WEIGHT: 293 LBS | DIASTOLIC BLOOD PRESSURE: 78 MMHG | SYSTOLIC BLOOD PRESSURE: 118 MMHG

## 2024-03-27 DIAGNOSIS — Z34.03 ENCOUNTER FOR SUPERVISION OF NORMAL FIRST PREGNANCY IN THIRD TRIMESTER: Primary | ICD-10-CM

## 2024-03-27 DIAGNOSIS — Z3A.35 35 WEEKS GESTATION OF PREGNANCY: ICD-10-CM

## 2024-03-27 DIAGNOSIS — Z34.03 ENCOUNTER FOR SUPERVISION OF NORMAL FIRST PREGNANCY IN THIRD TRIMESTER: ICD-10-CM

## 2024-03-27 DIAGNOSIS — R35.0 FREQUENCY OF URINATION: ICD-10-CM

## 2024-03-27 PROCEDURE — 99213 OFFICE O/P EST LOW 20 MIN: CPT | Mod: PBBFAC,25 | Performed by: ADVANCED PRACTICE MIDWIFE

## 2024-03-27 PROCEDURE — 76816 OB US FOLLOW-UP PER FETUS: CPT | Mod: 26,,, | Performed by: STUDENT IN AN ORGANIZED HEALTH CARE EDUCATION/TRAINING PROGRAM

## 2024-03-27 PROCEDURE — 87653 STREP B DNA AMP PROBE: CPT | Mod: ,,, | Performed by: CLINICAL MEDICAL LABORATORY

## 2024-03-27 PROCEDURE — 99999PBSHW POCT URINALYSIS W/O SCOPE: Mod: PBBFAC,,,

## 2024-03-27 PROCEDURE — 76816 OB US FOLLOW-UP PER FETUS: CPT | Mod: TC

## 2024-03-27 PROCEDURE — 81003 URINALYSIS AUTO W/O SCOPE: CPT | Mod: PBBFAC | Performed by: ADVANCED PRACTICE MIDWIFE

## 2024-03-27 PROCEDURE — 59426 ANTEPARTUM CARE ONLY: CPT | Mod: S$PBB,TH,, | Performed by: ADVANCED PRACTICE MIDWIFE

## 2024-03-27 NOTE — PROGRESS NOTES
Return OB Visit    23 y.o. female  at 35w6d   She denies any complaints. GBS today.   Reports good fetal movement or fluttering. Denies any vaginal bleeding, leakage of fluid, cramping, contractions, or pressure.   Total weight gain/weight loss in pregnancy: 18.1 kg (40 lb)     Vitals  BP: 118/78  Pulse: 102  Weight: (!) 138.3 kg (305 lb)  Prenatal  Fetal Heart Rate: 134  Movement: Present  Edema  LLE Edema: Mild pitting, slight indentation  RLE Edema: Mild pitting, slight indentation  Facial: None  Additional Edema?: No  Cervical Exam  Dilation: 1  Effacement (%): 20  Station: -3  Presentation: Vertex  Station (Labor Curve): 8 cm  Dilation/Effacement/Station  Dilation: 1  Effacement (%): 20  Station: -3    Prenatal Labs:  Lab Results   Component Value Date    GROUPTRH A POS 10/04/2023    HGB 12.6 2024    HCT 36.1 (L) 2024     2024    HEPBSAG Non-Reactive 10/04/2023    [id number] Non-Reactive 10/04/2023    LABNGO Negative 10/04/2023    LABURIN Skin/Urogenital Teetee Isolated, no further workup. 2023       A: 35w6d           ICD-10-CM ICD-9-CM    1. Encounter for supervision of normal first pregnancy in third trimester  Z34.03 V22.0 Strep B Screen, Antepartum      2. 35 weeks gestation of pregnancy  Z3A.35 V22.2 POCT URINALYSIS W/O SCOPE      Strep B Screen, Antepartum      3. Frequency of urination  R35.0 788.41 POCT URINALYSIS W/O SCOPE      US today EFW: 6lb 7oz CRUZ: 16.5cm Vertex     The following were addressed during this visit:    33-36 Weeks  - Childbirth Education/Hospital Tours   - Breastfeeding   - Group B Strep Test/HIV/RPR   - Fetal Kick Counts/PIH/PTL precautions     -Benefits of breastfeeding   -Rooming In and Skin to Skin    P: Bleeding, daily fetal kick counts, and  labor/labor precautions discussed.    Questions answered to desired level of satisfaction  Verbalized understanding to all information and instructions provided.  Follow up in about 1 week (around  4/3/2024) for OBV.    Mirlande Azul CNM, WHNP-BC

## 2024-03-29 LAB — CULTURE, GROUP B STREP: NORMAL

## 2024-04-03 ENCOUNTER — ROUTINE PRENATAL (OUTPATIENT)
Dept: OBSTETRICS AND GYNECOLOGY | Facility: CLINIC | Age: 24
End: 2024-04-03
Payer: MEDICAID

## 2024-04-03 VITALS
BODY MASS INDEX: 52.35 KG/M2 | DIASTOLIC BLOOD PRESSURE: 88 MMHG | SYSTOLIC BLOOD PRESSURE: 124 MMHG | HEART RATE: 108 BPM | WEIGHT: 293 LBS

## 2024-04-03 DIAGNOSIS — L29.9 ITCHING: ICD-10-CM

## 2024-04-03 DIAGNOSIS — O99.213 OBESITY AFFECTING PREGNANCY IN THIRD TRIMESTER, UNSPECIFIED OBESITY TYPE: Primary | ICD-10-CM

## 2024-04-03 DIAGNOSIS — Z3A.36 36 WEEKS GESTATION OF PREGNANCY: ICD-10-CM

## 2024-04-03 LAB
BILIRUB SERPL-MCNC: NORMAL MG/DL
BLOOD URINE, POC: NORMAL
COLOR, POC UA: NORMAL
GLUCOSE UR QL STRIP: NORMAL
KETONES UR QL STRIP: NORMAL
LEUKOCYTE ESTERASE URINE, POC: NORMAL
NITRITE, POC UA: NORMAL
PH, POC UA: 5
PROTEIN, POC: NORMAL
SPECIFIC GRAVITY, POC UA: 1.01
UROBILINOGEN, POC UA: NORMAL

## 2024-04-03 PROCEDURE — 81003 URINALYSIS AUTO W/O SCOPE: CPT | Mod: PBBFAC | Performed by: ADVANCED PRACTICE MIDWIFE

## 2024-04-03 PROCEDURE — 99213 OFFICE O/P EST LOW 20 MIN: CPT | Mod: PBBFAC | Performed by: ADVANCED PRACTICE MIDWIFE

## 2024-04-03 PROCEDURE — 59426 ANTEPARTUM CARE ONLY: CPT | Mod: S$PBB,TH,, | Performed by: ADVANCED PRACTICE MIDWIFE

## 2024-04-03 PROCEDURE — 99999PBSHW POCT URINALYSIS W/O SCOPE: Mod: PBBFAC,,,

## 2024-04-04 ENCOUNTER — PATIENT MESSAGE (OUTPATIENT)
Dept: OBSTETRICS AND GYNECOLOGY | Facility: CLINIC | Age: 24
End: 2024-04-04
Payer: MEDICAID

## 2024-04-10 ENCOUNTER — ROUTINE PRENATAL (OUTPATIENT)
Dept: OBSTETRICS AND GYNECOLOGY | Facility: CLINIC | Age: 24
End: 2024-04-10
Payer: MEDICAID

## 2024-04-10 VITALS
DIASTOLIC BLOOD PRESSURE: 70 MMHG | SYSTOLIC BLOOD PRESSURE: 120 MMHG | HEART RATE: 98 BPM | BODY MASS INDEX: 52.52 KG/M2 | WEIGHT: 293 LBS

## 2024-04-10 DIAGNOSIS — Z3A.37 37 WEEKS GESTATION OF PREGNANCY: ICD-10-CM

## 2024-04-10 DIAGNOSIS — O99.213 OBESITY AFFECTING PREGNANCY IN THIRD TRIMESTER, UNSPECIFIED OBESITY TYPE: Primary | ICD-10-CM

## 2024-04-10 DIAGNOSIS — R35.0 FREQUENCY OF URINATION: ICD-10-CM

## 2024-04-10 PROCEDURE — 59426 ANTEPARTUM CARE ONLY: CPT | Mod: S$PBB,TH,, | Performed by: ADVANCED PRACTICE MIDWIFE

## 2024-04-10 PROCEDURE — 99213 OFFICE O/P EST LOW 20 MIN: CPT | Mod: PBBFAC | Performed by: ADVANCED PRACTICE MIDWIFE

## 2024-04-10 PROCEDURE — 99999PBSHW POCT URINALYSIS W/O SCOPE: Mod: PBBFAC,,,

## 2024-04-10 PROCEDURE — 81003 URINALYSIS AUTO W/O SCOPE: CPT | Mod: PBBFAC | Performed by: ADVANCED PRACTICE MIDWIFE

## 2024-04-10 NOTE — PROGRESS NOTES
Return OB Visit    24 y.o. female  at 37w6d   She c/o pressure, cramps and edgard kelly.   Reports good fetal movement or fluttering. Denies any vaginal bleeding, or leakage of fluid.  Total weight gain/weight loss in pregnancy: 18.6 kg (41 lb)     Vitals  BP: 120/70  Pulse: 98  Weight: (!) 138.8 kg (306 lb)  Prenatal  Fetal Heart Rate: 140  Movement: Present  Edema  LLE Edema: Mild pitting, slight indentation  RLE Edema: Mild pitting, slight indentation  Facial: None  Additional Edema?: No  Cervical Exam  Dilation: 1.5  Effacement (%): 40  Station: -3  Presentation: Vertex  Station (Labor Curve): 8 cm  Dilation/Effacement/Station  Dilation: 1.5  Effacement (%): 40  Station: -3    Prenatal Labs:  Lab Results   Component Value Date    GROUPTRH A POS 10/04/2023    HGB 12.6 2024    HCT 36.1 (L) 2024     2024    HEPBSAG Non-Reactive 10/04/2023    [id number] Non-Reactive 10/04/2023    LABNGO Negative 10/04/2023    LABURIN Skin/Urogenital Teetee Isolated, no further workup. 2023       A: 37w6d           ICD-10-CM ICD-9-CM    1. Obesity affecting pregnancy in third trimester, unspecified obesity type  O99.213 649.13       2. 37 weeks gestation of pregnancy  Z3A.37 V22.2 POCT URINALYSIS W/O SCOPE      3. Frequency of urination  R35.0 788.41 POCT URINALYSIS W/O SCOPE          The following were addressed during this visit:    37-41 Weeks  - Postpartum Immunizations   - Hospital Stay Expectations   - Labor induction policy   - Cervical ripening   - Breastfeeding review: benefits of breastfeeding, early skin to skin, 24/7 rooming in, exclusive breastfeeding for 6 months     -Benefits of breastfeeding   -Rooming In and Skin to Skin    P: Bleeding, daily fetal kick counts, and  labor/labor precautions discussed.    Questions answered to desired level of satisfaction  Verbalized understanding to all information and instructions provided.  Follow up in about 1 week (around 2024) for  OBV.    Mirlande Azul CNM, [facility]-BC

## 2024-04-17 ENCOUNTER — ROUTINE PRENATAL (OUTPATIENT)
Dept: OBSTETRICS AND GYNECOLOGY | Facility: CLINIC | Age: 24
End: 2024-04-17
Payer: MEDICAID

## 2024-04-17 VITALS
WEIGHT: 293 LBS | SYSTOLIC BLOOD PRESSURE: 132 MMHG | DIASTOLIC BLOOD PRESSURE: 90 MMHG | HEART RATE: 99 BPM | BODY MASS INDEX: 52.7 KG/M2

## 2024-04-17 DIAGNOSIS — O99.213 OBESITY AFFECTING PREGNANCY IN THIRD TRIMESTER, UNSPECIFIED OBESITY TYPE: Primary | ICD-10-CM

## 2024-04-17 DIAGNOSIS — Z3A.38 38 WEEKS GESTATION OF PREGNANCY: ICD-10-CM

## 2024-04-17 DIAGNOSIS — R35.0 FREQUENCY OF URINATION: ICD-10-CM

## 2024-04-17 DIAGNOSIS — O12.03 GESTATIONAL EDEMA IN THIRD TRIMESTER: ICD-10-CM

## 2024-04-17 LAB
CREAT UR-MCNC: 196 MG/DL (ref 28–219)
PROT UR-MCNC: 26.7 MG/DL (ref 0–11.9)
PROT/CREAT 24H UR-RTO: 0.14

## 2024-04-17 PROCEDURE — 99213 OFFICE O/P EST LOW 20 MIN: CPT | Mod: PBBFAC | Performed by: ADVANCED PRACTICE MIDWIFE

## 2024-04-17 PROCEDURE — 59426 ANTEPARTUM CARE ONLY: CPT | Mod: S$PBB,TH,, | Performed by: ADVANCED PRACTICE MIDWIFE

## 2024-04-17 PROCEDURE — 84156 ASSAY OF PROTEIN URINE: CPT | Mod: ,,, | Performed by: CLINICAL MEDICAL LABORATORY

## 2024-04-17 PROCEDURE — 82570 ASSAY OF URINE CREATININE: CPT | Mod: ,,, | Performed by: CLINICAL MEDICAL LABORATORY

## 2024-04-17 NOTE — PROGRESS NOTES
"Return OB Visit    24 y.o. female  at 38w6d   She c/o pressure, cramps and edgard kelly. Denies headaches or visual changes. C/O swelling in lower extrem causing some tingling.  States they moved to a new house last weekend and she "overdid" it.   Reports good fetal movement or fluttering. Denies any vaginal bleeding or leakage of fluid.  Total weight gain/weight loss in pregnancy: 19.1 kg (42 lb)   BP Recheck 126/80    Vitals  BP: (!) 132/90  Pulse: 99  Weight: (!) 139.3 kg (307 lb)  Prenatal  Fetal Heart Rate: 140  Movement: Present  Edema  LLE Edema: Moderate pitting, indentation subsides rapidly  RLE Edema: Moderate pitting, indentation subsides rapidly  Facial: None  Additional Edema?: No  Cervical Exam  Dilation: 1.5  Effacement (%): 40  Station: -3  Presentation: Vertex  Station (Labor Curve): 8 cm  Dilation/Effacement/Station  Dilation: 1.5  Effacement (%): 40  Station: -3    Prenatal Labs:  Lab Results   Component Value Date    GROUPTRH A POS 10/04/2023    HGB 12.0 2024    HCT 36.9 (L) 2024     2024    HEPBSAG Non-Reactive 10/04/2023    [id number] Non-Reactive 10/04/2023    LABNGO Negative 10/04/2023    LABURIN Skin/Urogenital Teetee Isolated, no further workup. 2023       A: 38w6d           ICD-10-CM ICD-9-CM    1. Obesity affecting pregnancy in third trimester, unspecified obesity type  O99.213 649.13  OB Follow-up Ea Gestation Transabd      2. 38 weeks gestation of pregnancy  Z3A.38 V22.2 CANCELED: POCT URINALYSIS W/O SCOPE      3. Frequency of urination  R35.0 788.41 CANCELED: POCT URINALYSIS W/O SCOPE      4. Gestational edema in third trimester  O12.03 646.13 CBC Auto Differential      Comprehensive Metabolic Panel      Uric Acid      Protein/Creatinine Ratio, Urine          No pregnancy checklist tasks were completed during this visit, and no tasks are pending completion.    -Benefits of breastfeeding   -Rooming In and Skin to Skin    P: Bleeding, daily fetal kick " counts, and  labor/labor precautions discussed.  PreE precautions reviewed  PreE labs today    Questions answered to desired level of satisfaction  Verbalized understanding to all information and instructions provided.  Follow up in about 5 days (around 2024) for OBV, Ultrasound.    Mirlande Azul CNM, WILL-BC

## 2024-04-18 ENCOUNTER — PATIENT MESSAGE (OUTPATIENT)
Dept: OBSTETRICS AND GYNECOLOGY | Facility: CLINIC | Age: 24
End: 2024-04-18
Payer: MEDICAID

## 2024-04-22 ENCOUNTER — HOSPITAL ENCOUNTER (OUTPATIENT)
Dept: RADIOLOGY | Facility: HOSPITAL | Age: 24
Discharge: HOME | End: 2024-04-22
Attending: ADVANCED PRACTICE MIDWIFE
Payer: MEDICAID

## 2024-04-22 ENCOUNTER — ROUTINE PRENATAL (OUTPATIENT)
Dept: OBSTETRICS AND GYNECOLOGY | Facility: CLINIC | Age: 24
End: 2024-04-22
Payer: MEDICAID

## 2024-04-22 VITALS
DIASTOLIC BLOOD PRESSURE: 68 MMHG | SYSTOLIC BLOOD PRESSURE: 120 MMHG | BODY MASS INDEX: 53.21 KG/M2 | HEART RATE: 102 BPM | WEIGHT: 293 LBS

## 2024-04-22 DIAGNOSIS — Z3A.39 39 WEEKS GESTATION OF PREGNANCY: ICD-10-CM

## 2024-04-22 DIAGNOSIS — O99.213 OBESITY AFFECTING PREGNANCY IN THIRD TRIMESTER, UNSPECIFIED OBESITY TYPE: ICD-10-CM

## 2024-04-22 DIAGNOSIS — R35.0 FREQUENCY OF URINATION: ICD-10-CM

## 2024-04-22 DIAGNOSIS — O12.03 GESTATIONAL EDEMA IN THIRD TRIMESTER: ICD-10-CM

## 2024-04-22 DIAGNOSIS — O99.213 OBESITY AFFECTING PREGNANCY IN THIRD TRIMESTER, UNSPECIFIED OBESITY TYPE: Primary | ICD-10-CM

## 2024-04-22 PROCEDURE — 76816 OB US FOLLOW-UP PER FETUS: CPT | Mod: TC

## 2024-04-22 PROCEDURE — 76816 OB US FOLLOW-UP PER FETUS: CPT | Mod: 26,,, | Performed by: RADIOLOGY

## 2024-04-22 PROCEDURE — 82570 ASSAY OF URINE CREATININE: CPT | Mod: ,,, | Performed by: CLINICAL MEDICAL LABORATORY

## 2024-04-22 PROCEDURE — 99213 OFFICE O/P EST LOW 20 MIN: CPT | Mod: PBBFAC,25 | Performed by: ADVANCED PRACTICE MIDWIFE

## 2024-04-22 PROCEDURE — 84156 ASSAY OF PROTEIN URINE: CPT | Mod: ,,, | Performed by: CLINICAL MEDICAL LABORATORY

## 2024-04-22 PROCEDURE — 59426 ANTEPARTUM CARE ONLY: CPT | Mod: S$PBB,TH,, | Performed by: ADVANCED PRACTICE MIDWIFE

## 2024-04-22 NOTE — PROGRESS NOTES
Return OB Visit    24 y.o. female  at 39w4d   She c/o pressure, cramps or edgard kelly. C/o itching and dryness to outside of vaginal area. Denies h'ache, visual changes or epigastric pain.  Pt adamant regarding wanting spontaneous labor.  Discussed with pt need for closer surveillance after 40 weeks.  Pt verb understanding.  Pt will be scheduled to see Dr Donovan next week in my absence.    Reports good fetal movement or fluttering. Denies any vaginal bleeding or leakage of fluid.  Total weight gain/weight loss in pregnancy: 20.4 kg (45 lb)       Vitals  BP: 120/68  Pulse: 102  Weight: (!) 140.6 kg (310 lb)  Prenatal  Fetal Heart Rate: 144  Movement: Present  Edema  LLE Edema: Moderate pitting, indentation subsides rapidly  RLE Edema: Moderate pitting, indentation subsides rapidly  Facial: None  Additional Edema?: No  Cervical Exam  Dilation: 1.5  Effacement (%): 40  Station: -3  Presentation: Vertex  Station (Labor Curve): 8 cm  Dilation/Effacement/Station  Dilation: 1.5  Effacement (%): 40  Station: -3    Prenatal Labs:  Lab Results   Component Value Date    GROUPTRH A POS 10/04/2023    HGB 12.0 2024    HCT 36.9 (L) 2024     2024    HEPBSAG Non-Reactive 10/04/2023    [id number] Non-Reactive 10/04/2023    LABNGO Negative 10/04/2023    LABURIN Skin/Urogenital [place]et[place] Isolated, no further workup. 2023       A: 39w4d           ICD-10-CM ICD-9-CM    1. Obesity affecting pregnancy in third trimester, unspecified obesity type  O99.213 649.13 US OB Limited with Biophysical Profile (xpd)      2. 39 weeks gestation of pregnancy  Z3A.39 V22.2 POCT URINALYSIS W/O SCOPE      3. Frequency of urination  R35.0 788.41 POCT URINALYSIS W/O SCOPE      4. Gestational edema in third trimester  O12.03 646.13 CBC Auto Differential      Comprehensive Metabolic Panel      Uric Acid      Protein/Creatinine Ratio, Urine      US OB Limited with Biophysical Profile (xpd)      US today EFW: 8lb 7oz CRUZ: 16.12cm      No pregnancy checklist tasks were completed during this visit, and no tasks are pending completion.    -Benefits of breastfeeding   -Rooming In and Skin to Skin    P: Bleeding, daily fetal kick counts, and  labor/labor precautions discussed.  PreE precautions reviewed    Questions answered to desired level of satisfaction  Verbalized understanding to all information and instructions provided.  Follow up in about 1 week (around 2024) for OBV, Ultrasound, NST . (BPP on Monday next week)    Mirlande Azul CNM, WILL-BC

## 2024-04-24 LAB
CREAT UR-MCNC: 176 MG/DL (ref 28–219)
PROT UR-MCNC: 26.1 MG/DL (ref 0–11.9)
PROT/CREAT 24H UR-RTO: 0.15

## 2024-04-29 ENCOUNTER — ROUTINE PRENATAL (OUTPATIENT)
Dept: OBSTETRICS AND GYNECOLOGY | Facility: CLINIC | Age: 24
End: 2024-04-29
Payer: MEDICAID

## 2024-04-29 ENCOUNTER — HOSPITAL ENCOUNTER (OUTPATIENT)
Dept: RADIOLOGY | Facility: HOSPITAL | Age: 24
Discharge: HOME | End: 2024-04-29
Attending: ADVANCED PRACTICE MIDWIFE
Payer: MEDICAID

## 2024-04-29 VITALS
HEART RATE: 91 BPM | WEIGHT: 293 LBS | DIASTOLIC BLOOD PRESSURE: 91 MMHG | SYSTOLIC BLOOD PRESSURE: 132 MMHG | BODY MASS INDEX: 53.55 KG/M2

## 2024-04-29 DIAGNOSIS — Z3A.40 40 WEEKS GESTATION OF PREGNANCY: ICD-10-CM

## 2024-04-29 DIAGNOSIS — R35.0 FREQUENCY OF URINATION: ICD-10-CM

## 2024-04-29 DIAGNOSIS — O12.03 GESTATIONAL EDEMA IN THIRD TRIMESTER: ICD-10-CM

## 2024-04-29 DIAGNOSIS — O99.213 OBESITY AFFECTING PREGNANCY IN THIRD TRIMESTER, UNSPECIFIED OBESITY TYPE: Primary | ICD-10-CM

## 2024-04-29 DIAGNOSIS — O99.213 OBESITY AFFECTING PREGNANCY IN THIRD TRIMESTER, UNSPECIFIED OBESITY TYPE: ICD-10-CM

## 2024-04-29 LAB
BILIRUB SERPL-MCNC: NORMAL MG/DL
BLOOD URINE, POC: NORMAL
COLOR, POC UA: NORMAL
GLUCOSE UR QL STRIP: NORMAL
KETONES UR QL STRIP: NORMAL
LEUKOCYTE ESTERASE URINE, POC: NORMAL
NITRITE, POC UA: NORMAL
PH, POC UA: 6.5
PROTEIN, POC: NORMAL
SPECIFIC GRAVITY, POC UA: 1.01
UROBILINOGEN, POC UA: 0.2

## 2024-04-29 PROCEDURE — 76815 OB US LIMITED FETUS(S): CPT | Mod: TC

## 2024-04-29 PROCEDURE — 76819 FETAL BIOPHYS PROFIL W/O NST: CPT | Mod: 26,,, | Performed by: RADIOLOGY

## 2024-04-29 PROCEDURE — 76819 FETAL BIOPHYS PROFIL W/O NST: CPT | Mod: TC

## 2024-04-29 PROCEDURE — 76815 OB US LIMITED FETUS(S): CPT | Mod: 26,,, | Performed by: RADIOLOGY

## 2024-04-29 PROCEDURE — 99999PBSHW POCT URINALYSIS W/O SCOPE: Mod: PBBFAC,,,

## 2024-04-29 PROCEDURE — 99213 OFFICE O/P EST LOW 20 MIN: CPT | Mod: PBBFAC,25 | Performed by: STUDENT IN AN ORGANIZED HEALTH CARE EDUCATION/TRAINING PROGRAM

## 2024-04-29 PROCEDURE — 59426 ANTEPARTUM CARE ONLY: CPT | Mod: S$PBB,TH,, | Performed by: STUDENT IN AN ORGANIZED HEALTH CARE EDUCATION/TRAINING PROGRAM

## 2024-04-29 PROCEDURE — 81003 URINALYSIS AUTO W/O SCOPE: CPT | Mod: PBBFAC | Performed by: STUDENT IN AN ORGANIZED HEALTH CARE EDUCATION/TRAINING PROGRAM

## 2024-04-29 NOTE — PROGRESS NOTES
Return OB Visit    24 y.o. female  at 40w4d   She c/o irregular contractions and pressure.   Reports good fetal movement or fluttering. Denies any vaginal bleeding, leakage of fluid, or cramping.  Total weight gain/weight loss in pregnancy: 21.3 kg (47 lb)     Vitals  BP: (!) 132/91  Pulse: 91  Weight: (!) 141.5 kg (312 lb)  Prenatal  Fetal Heart Rate: 129  Movement: Present  Cervical Exam  Dilation: 1.5  Effacement (%): 40  Station: -3  Presentation: Vertex  Station (Labor Curve): 8 cm  Dilation/Effacement/Station  Dilation: 1.5  Effacement (%): 40  Station: -3  Repeat /78    Prenatal Labs:  Lab Results   Component Value Date    GROUPTRH A POS 10/04/2023    HGB 12.1 2024    HCT 38.0 2024     2024    HEPBSAG Non-Reactive 10/04/2023    [id number] Non-Reactive 10/04/2023    LABNGO Negative 10/04/2023    LABURIN Skin/Urogenital [place]et[place] Isolated, no further workup. 2023       A: 40w4d           ICD-10-CM ICD-9-CM    1. Obesity affecting pregnancy in third trimester, unspecified obesity type  O99.213 649.13 US OB Limited with Biophysical Profile (xpd)      2. Frequency of urination  R35.0 788.41 POCT URINALYSIS W/O SCOPE      3. 40 weeks gestation of pregnancy  Z3A.40 V22.2 US OB Limited with Biophysical Profile (xpd)          P: Bleeding, daily fetal kick counts, and  labor/labor precautions discussed.    The following were addressed during this visit:  - Importance of Breastfeeding       Orders Placed This Encounter   Procedures    US OB Limited with Biophysical Profile (xpd)     Standing Status:   Future     Standing Expiration Date:   2025     Order Specific Question:   May the Radiologist modify the order per protocol to meet the clinical needs of the patient?     Answer:   Yes    POCT URINALYSIS W/O SCOPE     BPP , CRUZ 16  Instructed to go to L&D on Thursday for NST.  Labor precautions given.  Questions answered to desired level of satisfaction  Verbalized  understanding to all information and instructions provided.  Follow up in about 1 week (around 5/6/2024) for OBV.    Alyse Boswell, DO OBGYN [facility]

## 2024-05-02 ENCOUNTER — HOSPITAL ENCOUNTER (EMERGENCY)
Facility: HOSPITAL | Age: 24
Discharge: HOME | End: 2024-05-02
Attending: OBSTETRICS & GYNECOLOGY
Payer: MEDICAID

## 2024-05-02 VITALS
RESPIRATION RATE: 20 BRPM | TEMPERATURE: 98 F | SYSTOLIC BLOOD PRESSURE: 133 MMHG | HEART RATE: 108 BPM | DIASTOLIC BLOOD PRESSURE: 74 MMHG

## 2024-05-02 DIAGNOSIS — O48.0 41 WEEKS GESTATION OF PREGNANCY: Primary | ICD-10-CM

## 2024-05-02 DIAGNOSIS — Z3A.41 41 WEEKS GESTATION OF PREGNANCY: Primary | ICD-10-CM

## 2024-05-02 PROCEDURE — 59025 FETAL NON-STRESS TEST: CPT

## 2024-05-02 PROCEDURE — 99284 EMERGENCY DEPT VISIT MOD MDM: CPT | Mod: 25

## 2024-05-02 NOTE — ED NOTES
Dr. Ardon at bedside to see pt. Strip reviewed by MD, reactive and complete per Dr. Ardon. Verbal dc order rec'd.

## 2024-05-02 NOTE — ED NOTES
Discharge instructions reviewed with patient and AVS given. Patient instructed to keep follow-up appointment on Mon., May 6th with ALEXANDRU Azul CNM and return to OB ED for any vaginal bleeding, leakage of fluid, or decreased fetal movement. Labor precautions given. Patient and significant other verbalize understanding. Patient discharged per order.

## 2024-05-02 NOTE — ED PROVIDER NOTES
Encounter Date: 2024    SYEDA Physician: Tonny Ardon   Primary OBGYN:Mirlande Azul CNM    Admit Diagnosis/Chief Complaint:  41 week EGA for NST  History     Chief Complaint   Patient presents with    Non-stress Test       23yo  at 41w0d coming for NST secondary to pregnancy at 41w  Denies vaginal bleeding, leakage of fluid or contractions  Reports good fetal movement    Desires spontaneous labor    Has appointment  with Mirlande Azul CNM            Obstetric HPI:  Patient reports None contractions, active fetal movement, absent vaginal bleeding , absent loss of fluid      Objective:     Vital Signs (Most Recent):  Temp: 98.2 °F (36.8 °C) (24 1232)  Pulse: 108 (24 1232)  Resp: 20 (24 1232)  BP: 133/74 (24 1232) Vital Signs (24h Range):  Temp:  [98.2 °F (36.8 °C)] 98.2 °F (36.8 °C)  Pulse:  [108] 108  Resp:  [20] 20  BP: (133)/(74) 133/74        There is no height or weight on file to calculate BMI.    FHT: 130 Cat 1 (reassuring)  TOCO:  Q no minutes    No intake or output data in the 24 hours ending 24 1330         Significant Labs:  Recent Lab Results       None          Review of patient's allergies indicates:  No Known Allergies  No past medical history on file.  No past surgical history on file.  Family History   Problem Relation Name Age of Onset    Stroke Maternal Grandfather      Hypertension Mother       Social History     Tobacco Use    Smoking status: Never    Smokeless tobacco: Never   Substance Use Topics    Alcohol use: Never    Drug use: Never     Review of Systems   Constitutional:  Negative for chills and fever.   HENT:  Negative for sore throat.    Eyes:  Negative for photophobia and visual disturbance.   Respiratory:  Negative for shortness of breath.    Cardiovascular:  Negative for chest pain and palpitations.   Gastrointestinal:  Negative for abdominal pain, blood in stool, constipation, diarrhea, nausea and vomiting.   Genitourinary:  Negative  for dysuria, genital sores, hematuria, pelvic pain, urgency, vaginal bleeding and vaginal discharge.   Musculoskeletal:  Negative for back pain.   Skin:  Negative for rash.   Neurological:  Negative for dizziness, seizures, syncope, weakness, light-headedness and headaches.   Hematological:  Does not bruise/bleed easily.       Physical Exam     Initial Vitals [05/02/24 1232]   BP Pulse Resp Temp SpO2   133/74 108 20 98.2 °F (36.8 °C) --      MAP       --         Physical Exam    Constitutional: She appears well-developed and well-nourished. No distress.   HENT:   Head: Normocephalic and atraumatic.   Eyes: Pupils are equal, round, and reactive to light. No scleral icterus.   Neck: Neck supple.   Normal range of motion.  Cardiovascular:  Normal rate, regular rhythm and normal heart sounds.           Pulmonary/Chest: Breath sounds normal. No respiratory distress. She exhibits no tenderness.   Abdominal: Abdomen is soft. She exhibits no distension. There is no abdominal tenderness.   Gravid - soft, nontender There is no rebound and no guarding.   Genitourinary:    Vagina normal.      No vaginal discharge.     Musculoskeletal:         General: Normal range of motion.      Cervical back: Normal range of motion and neck supple.     Neurological: She is alert and oriented to person, place, and time. She has normal reflexes. She displays normal reflexes.         ED Course   Labs Reviewed - No data to display     Imaging Results    None          Medical Decision Making    41 weeks  Desires Spontaneous labor  Sent for NST  Last cervical exam 1.5/40/-3      Amount and/or Complexity of Data Reviewed  ECG/medicine tests: ordered.     Details: NST      NON-STRESS TEST (NST) INTERPRETATION    Patient:  Jaida Zacarias    Patient of Mirlande Azul CNM    Date:  5/2/2024    Gestational Age:  41w0d    NST Indication(s):   41 weeks gestation    FINDINGS:    Baseline heart rate: 130    NST Variability: Moderate  Category  I    INTERPRETATION:   Reactive    Comments: 41 week EGA for NST    Follow up: Mirlande Azul CNM Monday am 5/6      Tonny Ardon MD          Medications - No data to display                           Clinical Impression:   Final diagnoses:  [O48.0, Z3A.41] 41 weeks gestation of pregnancy - FETAL KICK COUNTS, Strict Obstetric precautions.  (Primary)        ED Disposition Condition    Discharge Stable          ED Prescriptions    None       Follow-up Information       Follow up With Specialties Details Why Contact Info    Mirlande Azul CNM Obstetrics and Gynecology Go on 5/6/2024 Has appointment [address] [facility] [address]  [phone]      David Donovan, DO Obstetrics and Gynecology  If symptoms worsen [address] [facility] [address]  [phone]               Tonny Ardon MD  05/02/24 8127

## 2024-05-02 NOTE — ED NOTES
patient presents ambulatory for NST at 41.0 wks gestation. Patient denies any vaginal bleeding or leakage of fluid and endorses good fetal movement.

## 2024-05-06 ENCOUNTER — HOSPITAL ENCOUNTER (OUTPATIENT)
Dept: RADIOLOGY | Facility: HOSPITAL | Age: 24
Discharge: HOME | End: 2024-05-06
Attending: STUDENT IN AN ORGANIZED HEALTH CARE EDUCATION/TRAINING PROGRAM
Payer: MEDICAID

## 2024-05-06 ENCOUNTER — ROUTINE PRENATAL (OUTPATIENT)
Dept: OBSTETRICS AND GYNECOLOGY | Facility: CLINIC | Age: 24
End: 2024-05-06
Payer: MEDICAID

## 2024-05-06 ENCOUNTER — HOSPITAL ENCOUNTER (INPATIENT)
Facility: HOSPITAL | Age: 24
LOS: 3 days | Discharge: HOME | End: 2024-05-09
Attending: STUDENT IN AN ORGANIZED HEALTH CARE EDUCATION/TRAINING PROGRAM | Admitting: STUDENT IN AN ORGANIZED HEALTH CARE EDUCATION/TRAINING PROGRAM
Payer: MEDICAID

## 2024-05-06 VITALS
DIASTOLIC BLOOD PRESSURE: 80 MMHG | HEART RATE: 102 BPM | SYSTOLIC BLOOD PRESSURE: 130 MMHG | WEIGHT: 293 LBS | BODY MASS INDEX: 53.38 KG/M2

## 2024-05-06 DIAGNOSIS — O48.0 POST-TERM PREGNANCY, 40-42 WEEKS OF GESTATION: ICD-10-CM

## 2024-05-06 DIAGNOSIS — O99.213 OBESITY AFFECTING PREGNANCY IN THIRD TRIMESTER, UNSPECIFIED OBESITY TYPE: ICD-10-CM

## 2024-05-06 DIAGNOSIS — O12.03 GESTATIONAL EDEMA IN THIRD TRIMESTER: ICD-10-CM

## 2024-05-06 DIAGNOSIS — Z3A.41 41 WEEKS GESTATION OF PREGNANCY: ICD-10-CM

## 2024-05-06 DIAGNOSIS — O48.0 41 WEEKS GESTATION OF PREGNANCY: ICD-10-CM

## 2024-05-06 DIAGNOSIS — O99.213 OBESITY AFFECTING PREGNANCY IN THIRD TRIMESTER, UNSPECIFIED OBESITY TYPE: Primary | ICD-10-CM

## 2024-05-06 DIAGNOSIS — Z3A.40 40 WEEKS GESTATION OF PREGNANCY: ICD-10-CM

## 2024-05-06 DIAGNOSIS — R35.0 FREQUENCY OF URINATION: ICD-10-CM

## 2024-05-06 LAB
ALBUMIN SERPL BCP-MCNC: 2.4 G/DL (ref 3.5–5)
ALBUMIN/GLOB SERPL: 0.6 {RATIO}
ALP SERPL-CCNC: 210 U/L (ref 37–98)
ALT SERPL W P-5'-P-CCNC: 19 U/L (ref 13–56)
ANION GAP SERPL CALCULATED.3IONS-SCNC: 18 MMOL/L (ref 7–16)
AST SERPL W P-5'-P-CCNC: 22 U/L (ref 15–37)
BACTERIA #/AREA URNS HPF: ABNORMAL /HPF
BASOPHILS # BLD AUTO: 0.04 K/UL (ref 0–0.2)
BASOPHILS NFR BLD AUTO: 0.3 % (ref 0–1)
BILIRUB SERPL-MCNC: 0.5 MG/DL (ref ?–1.2)
BILIRUB SERPL-MCNC: NORMAL MG/DL
BILIRUB UR QL STRIP: NEGATIVE
BLOOD URINE, POC: NORMAL
BUN SERPL-MCNC: 6 MG/DL (ref 7–18)
BUN/CREAT SERPL: 11 (ref 6–20)
CALCIUM SERPL-MCNC: 9 MG/DL (ref 8.5–10.1)
CHLORIDE SERPL-SCNC: 108 MMOL/L (ref 98–107)
CLARITY UR: CLEAR
CO2 SERPL-SCNC: 19 MMOL/L (ref 21–32)
COLOR UR: ABNORMAL
COLOR, POC UA: NORMAL
CREAT SERPL-MCNC: 0.54 MG/DL (ref 0.55–1.02)
DIFFERENTIAL METHOD BLD: ABNORMAL
EGFR (NO RACE VARIABLE) (RUSH/TITUS): 130 ML/MIN/1.73M2
EOSINOPHIL # BLD AUTO: 0.08 K/UL (ref 0–0.5)
EOSINOPHIL NFR BLD AUTO: 0.7 % (ref 1–4)
ERYTHROCYTE [DISTWIDTH] IN BLOOD BY AUTOMATED COUNT: 13.2 % (ref 11.5–14.5)
GLOBULIN SER-MCNC: 3.7 G/DL (ref 2–4)
GLUCOSE SERPL-MCNC: 96 MG/DL (ref 74–106)
GLUCOSE UR QL STRIP: NORMAL
GLUCOSE UR STRIP-MCNC: NORMAL MG/DL
HBV SURFACE AG SERPL QL IA: NORMAL
HCT VFR BLD AUTO: 37.4 % (ref 38–47)
HGB BLD-MCNC: 12.1 G/DL (ref 12–16)
HIV 1+O+2 AB SERPL QL: NORMAL
IMM GRANULOCYTES # BLD AUTO: 0.05 K/UL (ref 0–0.04)
IMM GRANULOCYTES NFR BLD: 0.4 % (ref 0–0.4)
INDIRECT COOMBS: NORMAL
KETONES UR QL STRIP: NORMAL
KETONES UR STRIP-SCNC: NEGATIVE MG/DL
LEUKOCYTE ESTERASE UR QL STRIP: ABNORMAL
LEUKOCYTE ESTERASE URINE, POC: NORMAL
LYMPHOCYTES # BLD AUTO: 2.64 K/UL (ref 1–4.8)
LYMPHOCYTES NFR BLD AUTO: 22.5 % (ref 27–41)
MCH RBC QN AUTO: 27.4 PG (ref 27–31)
MCHC RBC AUTO-ENTMCNC: 32.4 G/DL (ref 32–36)
MCV RBC AUTO: 84.6 FL (ref 80–96)
MONOCYTES # BLD AUTO: 0.66 K/UL (ref 0–0.8)
MONOCYTES NFR BLD AUTO: 5.6 % (ref 2–6)
MPC BLD CALC-MCNC: 9.5 FL (ref 9.4–12.4)
MUCOUS, UA: ABNORMAL /LPF
NEUTROPHILS # BLD AUTO: 8.28 K/UL (ref 1.8–7.7)
NEUTROPHILS NFR BLD AUTO: 70.5 % (ref 53–65)
NITRITE UR QL STRIP: NEGATIVE
NITRITE, POC UA: NORMAL
NRBC # BLD AUTO: 0 X10E3/UL
NRBC, AUTO (.00): 0 %
PH UR STRIP: 6.5 PH UNITS
PH, POC UA: 6.5
PLATELET # BLD AUTO: 278 K/UL (ref 150–400)
POTASSIUM SERPL-SCNC: 3.9 MMOL/L (ref 3.5–5.1)
PROT SERPL-MCNC: 6.1 G/DL (ref 6.4–8.2)
PROT UR QL STRIP: 10
PROTEIN, POC: NORMAL
RBC # BLD AUTO: 4.42 M/UL (ref 4.2–5.4)
RBC # UR STRIP: ABNORMAL /UL
RBC #/AREA URNS HPF: 2 /HPF
RH BLD: NORMAL
SODIUM SERPL-SCNC: 141 MMOL/L (ref 136–145)
SP GR UR STRIP: 1.02
SPECIFIC GRAVITY, POC UA: 1.02
SPECIMEN OUTDATE: NORMAL
SQUAMOUS #/AREA URNS LPF: ABNORMAL /HPF
SYPHILIS AB INTERPRETATION: NORMAL
UROBILINOGEN UR STRIP-ACNC: NORMAL MG/DL
UROBILINOGEN, POC UA: NORMAL
WBC # BLD AUTO: 11.75 K/UL (ref 4.5–11)
WBC #/AREA URNS HPF: 8 /HPF

## 2024-05-06 PROCEDURE — 36415 COLL VENOUS BLD VENIPUNCTURE: CPT | Performed by: ADVANCED PRACTICE MIDWIFE

## 2024-05-06 PROCEDURE — 86850 RBC ANTIBODY SCREEN: CPT | Performed by: ADVANCED PRACTICE MIDWIFE

## 2024-05-06 PROCEDURE — 3E033VJ INTRODUCTION OF OTHER HORMONE INTO PERIPHERAL VEIN, PERCUTANEOUS APPROACH: ICD-10-PCS | Performed by: STUDENT IN AN ORGANIZED HEALTH CARE EDUCATION/TRAINING PROGRAM

## 2024-05-06 PROCEDURE — 81003 URINALYSIS AUTO W/O SCOPE: CPT | Mod: PBBFAC | Performed by: ADVANCED PRACTICE MIDWIFE

## 2024-05-06 PROCEDURE — 76819 FETAL BIOPHYS PROFIL W/O NST: CPT | Mod: TC

## 2024-05-06 PROCEDURE — 59426 ANTEPARTUM CARE ONLY: CPT | Mod: S$PBB,TH,, | Performed by: ADVANCED PRACTICE MIDWIFE

## 2024-05-06 PROCEDURE — 76815 OB US LIMITED FETUS(S): CPT | Mod: TC

## 2024-05-06 PROCEDURE — 87340 HEPATITIS B SURFACE AG IA: CPT | Performed by: ADVANCED PRACTICE MIDWIFE

## 2024-05-06 PROCEDURE — 81003 URINALYSIS AUTO W/O SCOPE: CPT | Performed by: STUDENT IN AN ORGANIZED HEALTH CARE EDUCATION/TRAINING PROGRAM

## 2024-05-06 PROCEDURE — 85025 COMPLETE CBC W/AUTO DIFF WBC: CPT | Performed by: ADVANCED PRACTICE MIDWIFE

## 2024-05-06 PROCEDURE — 86780 TREPONEMA PALLIDUM: CPT | Performed by: ADVANCED PRACTICE MIDWIFE

## 2024-05-06 PROCEDURE — 87389 HIV-1 AG W/HIV-1&-2 AB AG IA: CPT | Performed by: ADVANCED PRACTICE MIDWIFE

## 2024-05-06 PROCEDURE — 99999PBSHW POCT URINALYSIS W/O SCOPE: Mod: PBBFAC,,,

## 2024-05-06 PROCEDURE — 59025 FETAL NON-STRESS TEST: CPT | Mod: PBBFAC | Performed by: ADVANCED PRACTICE MIDWIFE

## 2024-05-06 PROCEDURE — 80053 COMPREHEN METABOLIC PANEL: CPT | Performed by: ADVANCED PRACTICE MIDWIFE

## 2024-05-06 PROCEDURE — 3E0P7VZ INTRODUCTION OF HORMONE INTO FEMALE REPRODUCTIVE, VIA NATURAL OR ARTIFICIAL OPENING: ICD-10-PCS | Performed by: STUDENT IN AN ORGANIZED HEALTH CARE EDUCATION/TRAINING PROGRAM

## 2024-05-06 PROCEDURE — 11000001 HC ACUTE MED/SURG PRIVATE ROOM

## 2024-05-06 PROCEDURE — 76819 FETAL BIOPHYS PROFIL W/O NST: CPT | Mod: 26,,, | Performed by: RADIOLOGY

## 2024-05-06 PROCEDURE — 63600175 PHARM REV CODE 636 W HCPCS: Performed by: ADVANCED PRACTICE MIDWIFE

## 2024-05-06 PROCEDURE — 25000003 PHARM REV CODE 250: Performed by: ADVANCED PRACTICE MIDWIFE

## 2024-05-06 PROCEDURE — 99213 OFFICE O/P EST LOW 20 MIN: CPT | Mod: PBBFAC,25 | Performed by: ADVANCED PRACTICE MIDWIFE

## 2024-05-06 PROCEDURE — 76815 OB US LIMITED FETUS(S): CPT | Mod: 26,,, | Performed by: RADIOLOGY

## 2024-05-06 RX ORDER — CARBOPROST TROMETHAMINE 250 UG/ML
250 INJECTION, SOLUTION INTRAMUSCULAR
Status: DISCONTINUED | OUTPATIENT
Start: 2024-05-06 | End: 2024-05-09 | Stop reason: HOSPADM

## 2024-05-06 RX ORDER — OXYTOCIN/RINGER'S LACTATE 30/500 ML
95 PLASTIC BAG, INJECTION (ML) INTRAVENOUS ONCE
Status: CANCELLED | OUTPATIENT
Start: 2024-05-06 | End: 2024-05-06

## 2024-05-06 RX ORDER — OXYTOCIN/RINGER'S LACTATE 30/500 ML
334 PLASTIC BAG, INJECTION (ML) INTRAVENOUS ONCE AS NEEDED
Status: DISCONTINUED | OUTPATIENT
Start: 2024-05-06 | End: 2024-05-09 | Stop reason: HOSPADM

## 2024-05-06 RX ORDER — ONDANSETRON 4 MG/1
8 TABLET, ORALLY DISINTEGRATING ORAL EVERY 8 HOURS PRN
Status: CANCELLED | OUTPATIENT
Start: 2024-05-06

## 2024-05-06 RX ORDER — MISOPROSTOL 200 UG/1
800 TABLET ORAL ONCE AS NEEDED
Status: COMPLETED | OUTPATIENT
Start: 2024-05-06 | End: 2024-05-07

## 2024-05-06 RX ORDER — SIMETHICONE 80 MG
1 TABLET,CHEWABLE ORAL 4 TIMES DAILY PRN
Status: CANCELLED | OUTPATIENT
Start: 2024-05-06

## 2024-05-06 RX ORDER — SIMETHICONE 80 MG
1 TABLET,CHEWABLE ORAL 4 TIMES DAILY PRN
Status: DISCONTINUED | OUTPATIENT
Start: 2024-05-06 | End: 2024-05-09 | Stop reason: HOSPADM

## 2024-05-06 RX ORDER — OXYTOCIN/RINGER'S LACTATE 30/500 ML
334 PLASTIC BAG, INJECTION (ML) INTRAVENOUS ONCE
Status: CANCELLED | OUTPATIENT
Start: 2024-05-06 | End: 2024-05-06

## 2024-05-06 RX ORDER — MISOPROSTOL 100 MCG
25 TABLET ORAL EVERY 4 HOURS PRN
Status: CANCELLED | OUTPATIENT
Start: 2024-05-06

## 2024-05-06 RX ORDER — ONDANSETRON 4 MG/1
8 TABLET, ORALLY DISINTEGRATING ORAL EVERY 8 HOURS PRN
Status: DISCONTINUED | OUTPATIENT
Start: 2024-05-06 | End: 2024-05-09 | Stop reason: HOSPADM

## 2024-05-06 RX ORDER — SODIUM CHLORIDE 9 MG/ML
INJECTION, SOLUTION INTRAVENOUS
Status: DISCONTINUED | OUTPATIENT
Start: 2024-05-06 | End: 2024-05-09 | Stop reason: HOSPADM

## 2024-05-06 RX ORDER — BUTORPHANOL TARTRATE 1 MG/ML
1 INJECTION INTRAMUSCULAR; INTRAVENOUS
Status: CANCELLED | OUTPATIENT
Start: 2024-05-06

## 2024-05-06 RX ORDER — METHYLERGONOVINE MALEATE 0.2 MG/ML
200 INJECTION INTRAVENOUS ONCE AS NEEDED
Status: COMPLETED | OUTPATIENT
Start: 2024-05-06 | End: 2024-05-07

## 2024-05-06 RX ORDER — OXYTOCIN/RINGER'S LACTATE 30/500 ML
95 PLASTIC BAG, INJECTION (ML) INTRAVENOUS ONCE
Status: DISCONTINUED | OUTPATIENT
Start: 2024-05-06 | End: 2024-05-08

## 2024-05-06 RX ORDER — CALCIUM CARBONATE 200(500)MG
500 TABLET,CHEWABLE ORAL 3 TIMES DAILY PRN
Status: DISCONTINUED | OUTPATIENT
Start: 2024-05-06 | End: 2024-05-09 | Stop reason: HOSPADM

## 2024-05-06 RX ORDER — TRANEXAMIC ACID 10 MG/ML
1000 INJECTION, SOLUTION INTRAVENOUS EVERY 30 MIN PRN
Status: CANCELLED | OUTPATIENT
Start: 2024-05-06

## 2024-05-06 RX ORDER — OXYTOCIN/RINGER'S LACTATE 30/500 ML
334 PLASTIC BAG, INJECTION (ML) INTRAVENOUS ONCE
Status: DISCONTINUED | OUTPATIENT
Start: 2024-05-06 | End: 2024-05-08

## 2024-05-06 RX ORDER — TRANEXAMIC ACID 10 MG/ML
1000 INJECTION, SOLUTION INTRAVENOUS EVERY 30 MIN PRN
Status: DISCONTINUED | OUTPATIENT
Start: 2024-05-06 | End: 2024-05-09 | Stop reason: HOSPADM

## 2024-05-06 RX ORDER — SODIUM CHLORIDE, SODIUM LACTATE, POTASSIUM CHLORIDE, CALCIUM CHLORIDE 600; 310; 30; 20 MG/100ML; MG/100ML; MG/100ML; MG/100ML
INJECTION, SOLUTION INTRAVENOUS CONTINUOUS
Status: DISCONTINUED | OUTPATIENT
Start: 2024-05-06 | End: 2024-05-08

## 2024-05-06 RX ORDER — OXYTOCIN/RINGER'S LACTATE 30/500 ML
95 PLASTIC BAG, INJECTION (ML) INTRAVENOUS ONCE AS NEEDED
Status: DISCONTINUED | OUTPATIENT
Start: 2024-05-06 | End: 2024-05-09 | Stop reason: HOSPADM

## 2024-05-06 RX ORDER — OXYTOCIN/RINGER'S LACTATE 30/500 ML
95 PLASTIC BAG, INJECTION (ML) INTRAVENOUS ONCE AS NEEDED
Status: CANCELLED | OUTPATIENT
Start: 2024-05-06 | End: 2035-10-03

## 2024-05-06 RX ORDER — TERBUTALINE SULFATE 1 MG/ML
0.25 INJECTION SUBCUTANEOUS
Status: CANCELLED | OUTPATIENT
Start: 2024-05-06

## 2024-05-06 RX ORDER — OXYTOCIN 10 [USP'U]/ML
10 INJECTION, SOLUTION INTRAMUSCULAR; INTRAVENOUS ONCE AS NEEDED
Status: DISCONTINUED | OUTPATIENT
Start: 2024-05-06 | End: 2024-05-09 | Stop reason: HOSPADM

## 2024-05-06 RX ORDER — BUTORPHANOL TARTRATE 2 MG/ML
2 INJECTION INTRAMUSCULAR; INTRAVENOUS
Status: DISCONTINUED | OUTPATIENT
Start: 2024-05-06 | End: 2024-05-07

## 2024-05-06 RX ORDER — MISOPROSTOL 200 UG/1
800 TABLET ORAL ONCE AS NEEDED
Status: CANCELLED | OUTPATIENT
Start: 2024-05-06 | End: 2035-10-03

## 2024-05-06 RX ORDER — BUTORPHANOL TARTRATE 2 MG/ML
1 INJECTION INTRAMUSCULAR; INTRAVENOUS
Status: DISCONTINUED | OUTPATIENT
Start: 2024-05-06 | End: 2024-05-09 | Stop reason: HOSPADM

## 2024-05-06 RX ORDER — OXYTOCIN/RINGER'S LACTATE 30/500 ML
334 PLASTIC BAG, INJECTION (ML) INTRAVENOUS ONCE AS NEEDED
Status: CANCELLED | OUTPATIENT
Start: 2024-05-06 | End: 2035-10-03

## 2024-05-06 RX ORDER — TERBUTALINE SULFATE 1 MG/ML
0.25 INJECTION SUBCUTANEOUS
Status: DISCONTINUED | OUTPATIENT
Start: 2024-05-06 | End: 2024-05-09 | Stop reason: HOSPADM

## 2024-05-06 RX ORDER — HYDROXYZINE HYDROCHLORIDE 25 MG/ML
25 INJECTION, SOLUTION INTRAMUSCULAR EVERY 6 HOURS PRN
Status: DISCONTINUED | OUTPATIENT
Start: 2024-05-06 | End: 2024-05-09 | Stop reason: HOSPADM

## 2024-05-06 RX ORDER — ZOLPIDEM TARTRATE 5 MG/1
10 TABLET ORAL NIGHTLY PRN
Status: DISCONTINUED | OUTPATIENT
Start: 2024-05-06 | End: 2024-05-09 | Stop reason: HOSPADM

## 2024-05-06 RX ORDER — SODIUM CHLORIDE, SODIUM LACTATE, POTASSIUM CHLORIDE, CALCIUM CHLORIDE 600; 310; 30; 20 MG/100ML; MG/100ML; MG/100ML; MG/100ML
INJECTION, SOLUTION INTRAVENOUS CONTINUOUS
Status: CANCELLED | OUTPATIENT
Start: 2024-05-06

## 2024-05-06 RX ORDER — MUPIROCIN 20 MG/G
OINTMENT TOPICAL
Status: DISCONTINUED | OUTPATIENT
Start: 2024-05-06 | End: 2024-05-09 | Stop reason: HOSPADM

## 2024-05-06 RX ORDER — MISOPROSTOL 200 UG/1
800 TABLET ORAL ONCE AS NEEDED
Status: DISCONTINUED | OUTPATIENT
Start: 2024-05-06 | End: 2024-05-09 | Stop reason: HOSPADM

## 2024-05-06 RX ORDER — OXYTOCIN 10 [USP'U]/ML
10 INJECTION, SOLUTION INTRAMUSCULAR; INTRAVENOUS ONCE AS NEEDED
Status: CANCELLED | OUTPATIENT
Start: 2024-05-06 | End: 2035-10-03

## 2024-05-06 RX ORDER — MUPIROCIN 20 MG/G
OINTMENT TOPICAL
Status: CANCELLED | OUTPATIENT
Start: 2024-05-06

## 2024-05-06 RX ORDER — CARBOPROST TROMETHAMINE 250 UG/ML
250 INJECTION, SOLUTION INTRAMUSCULAR
Status: CANCELLED | OUTPATIENT
Start: 2024-05-06

## 2024-05-06 RX ORDER — MISOPROSTOL 200 UG/1
800 TABLET ORAL ONCE AS NEEDED
Status: CANCELLED | OUTPATIENT
Start: 2024-05-06

## 2024-05-06 RX ORDER — LIDOCAINE HYDROCHLORIDE 10 MG/ML
10 INJECTION INFILTRATION; PERINEURAL ONCE AS NEEDED
Status: DISCONTINUED | OUTPATIENT
Start: 2024-05-06 | End: 2024-05-09 | Stop reason: HOSPADM

## 2024-05-06 RX ORDER — MISOPROSTOL 100 MCG
25 TABLET ORAL EVERY 4 HOURS PRN
Status: DISCONTINUED | OUTPATIENT
Start: 2024-05-06 | End: 2024-05-09 | Stop reason: HOSPADM

## 2024-05-06 RX ORDER — SODIUM CHLORIDE 9 MG/ML
INJECTION, SOLUTION INTRAVENOUS
Status: CANCELLED | OUTPATIENT
Start: 2024-05-06

## 2024-05-06 RX ORDER — CALCIUM CARBONATE 200(500)MG
500 TABLET,CHEWABLE ORAL 3 TIMES DAILY PRN
Status: CANCELLED | OUTPATIENT
Start: 2024-05-06

## 2024-05-06 RX ORDER — DIPHENOXYLATE HYDROCHLORIDE AND ATROPINE SULFATE 2.5; .025 MG/1; MG/1
2 TABLET ORAL EVERY 6 HOURS PRN
Status: CANCELLED | OUTPATIENT
Start: 2024-05-06

## 2024-05-06 RX ORDER — DIPHENOXYLATE HYDROCHLORIDE AND ATROPINE SULFATE 2.5; .025 MG/1; MG/1
2 TABLET ORAL EVERY 6 HOURS PRN
Status: DISCONTINUED | OUTPATIENT
Start: 2024-05-06 | End: 2024-05-09 | Stop reason: HOSPADM

## 2024-05-06 RX ORDER — LIDOCAINE HYDROCHLORIDE 10 MG/ML
10 INJECTION INFILTRATION; PERINEURAL ONCE AS NEEDED
Status: CANCELLED | OUTPATIENT
Start: 2024-05-06 | End: 2035-10-03

## 2024-05-06 RX ORDER — METHYLERGONOVINE MALEATE 0.2 MG/ML
200 INJECTION INTRAVENOUS ONCE AS NEEDED
Status: CANCELLED | OUTPATIENT
Start: 2024-05-06 | End: 2035-10-03

## 2024-05-06 RX ORDER — BUTORPHANOL TARTRATE 2 MG/ML
1 INJECTION INTRAMUSCULAR; INTRAVENOUS
Status: DISCONTINUED | OUTPATIENT
Start: 2024-05-06 | End: 2024-05-07

## 2024-05-06 RX ADMIN — SODIUM CHLORIDE, POTASSIUM CHLORIDE, SODIUM LACTATE AND CALCIUM CHLORIDE: 600; 310; 30; 20 INJECTION, SOLUTION INTRAVENOUS at 08:05

## 2024-05-06 RX ADMIN — MISOPROSTOL 25 MCG: 100 TABLET ORAL at 08:05

## 2024-05-06 NOTE — PROCEDURES
Fetal non-stress test- Today    Date/Time: 2024 9:00 AM    Performed by: Mirlande Azul CNM  Authorized by: Mirlande Azul CNM    Nonstress Test:     Variability:  6-25 BPM    Decelerations:  None    Accelerations:  15 bpm    Acoustic Stimulator: No      Baseline:  125    Uterine Irritability: No      Contractions:  Irregular  Biophysical Profile:     Fetal Breathin    Fetal Movement:  2    Fetal Tone:  2    Fluid Volume:  2    Nonstress Test:  2    Biophysical Profile Score  (of 8):  8    Biophysical Profile Score  (of 10):  10    CRUZ (if indicated) (cm):  15    Nonstress Test Interpretation: reactive      Overall Impression:  Reassuring  Post-procedure:     Patient tolerance:  Patient tolerated the procedure well with no immediate complications

## 2024-05-06 NOTE — PROGRESS NOTES
Return OB Visit    24 y.o. female  at 41w4d   She c/o pressure, cramping and edgard kelly.  Requesting IOL.  Discussed r/b/a and pt wishes to proceed.   Reports good fetal movement or fluttering. Denies any vaginal bleeding or leakage of fluid.  Total weight gain/weight loss in pregnancy: 20.9 kg (46 lb)     Vitals  BP: 130/80  Pulse: 102  Weight: (!) 141.1 kg (311 lb)  Prenatal  Fetal Heart Rate: 120  Movement: Present  Edema  LLE Edema: Deep pitting, indentation remains for a short time  RLE Edema: Deep pitting, indentation remains for a short time  Facial: None  Additional Edema?: No  Cervical Exam  Dilation: 1.5  Effacement (%): 50  Station: -3  Presentation: Vertex  Station (Labor Curve): 8 cm  Dilation/Effacement/Station  Dilation: 1.5  Effacement (%): 50  Station: -3    Prenatal Labs:  Lab Results   Component Value Date    GROUPTRH A POS 10/04/2023    HGB 12.1 2024    HCT 38.0 2024     2024    HEPBSAG Non-Reactive 10/04/2023    [id number] Non-Reactive 10/04/2023    LABNGO Negative 10/04/2023    LABURIN Skin/Urogenital [place]etee Isolated, no further workup. 2023       A: 41w4d           ICD-10-CM ICD-9-CM    1. Obesity affecting pregnancy in third trimester, unspecified obesity type  O99.213 649.13 Vital Signs      Vital signs- Early Labor(0-4 cm)      Vital Signs- Active Labor (4-10 cm)      Vital Signs Post Delivery      Check Temperature, Membranes Intact      Check Temperature, Membranes Ruptured      Pulse Oximetry Continous while CONTINUOUS electronic fetal Monitor in use      Cervical Exam      Fetal / Uterine Monitoring - Continuous      Fetal monitoring - scalp electrode      Insert peripheral IV      Lares to [place]      Lares Catheter Care every 12 hours      Nurse to discontinue lares when patient no longer meets criteria      Post Lares Catheter Removal Protocol      Perform Bladder scan      Perform Intermittent Catheterization      Perform Bladder Scan, post  intermittent cath      Place indwelling catheter      Watch for excessive vaginal bleeding      Decrease Oxytocin      Discontinue oxytocin      Oxytocin Titration Resumption      Amnioinfusion PRN recurrent variable decelerations      Administer a 500 -1000 ml IV fluid bolus as needed for      Assess fundal height/uterine firmness, lochia, perineum      Notify Physician      Notify OB care provider      Notify physician of cervical change or lack of change      Notify physician for excessive uterine activity      Notify physician for Fetal Heart Tones consistent with Category II or Category III tracing      Notify Pediatrician after delivery      Notify Nursery / Level II Nursery      Notify Nursery / Level II Nursery      Abdominal prep for  Section      Chlorhexidine (CHG) 2% Wipes      Chlorohexidine Gluconate Bath      Vital signs every 15 minutes      Vital signs every 15 minutes      CBC with Auto Differential      Comprehensive metabolic panel      Type & Screen      Syphilis Antibody (RPR)      HIV 1/2 Ag/Ab (4th Gen)      Hepatitis B surface antigen      POCT Cord Blood Gas Umbilical Artery Cord Gas      POCT Cord Blood Gas Umbilical Vein Cord Gas      Inpatient consult to Anesthesiology      Full code      Admit to Inpatient      Saline lock IV      Perform baseline 20 minute non stress test - proceed with other orders if reactive criteria met.      Maternal vital signs, fetal and uterine assessments after each dose and hourly      Electronic fetal monitoring      May ambulate 30 minutes after insertion of Misoprostol (Cytotec)      Withhold Misoprostol (Cytotec) dosing:      Oxytocin should be delayed until at least 4 hours after the last Misoprostol (Cytotec) dose administered      BREAST PUMP FOR HOME USE      2. 41 weeks gestation of pregnancy  O48.0 645.10 POCT URINALYSIS W/O SCOPE    Z3A.41  Vital Signs      Vital signs- Early Labor(0-4 cm)      Vital Signs- Active Labor (4-10 cm)      Vital  Signs Post Delivery      Check Temperature, Membranes Intact      Check Temperature, Membranes Ruptured      Pulse Oximetry Continous while CONTINUOUS electronic fetal Monitor in use      Cervical Exam      Fetal / Uterine Monitoring - Continuous      Fetal monitoring - scalp electrode      Insert peripheral IV      Lares to [place]      Lares Catheter Care every 12 hours      Nurse to discontinue lares when patient no longer meets criteria      Post Lares Catheter Removal Protocol      Perform Bladder scan      Perform Intermittent Catheterization      Perform Bladder Scan, post intermittent cath      Place indwelling catheter      Watch for excessive vaginal bleeding      Decrease Oxytocin      Discontinue oxytocin      Oxytocin Titration Resumption      Amnioinfusion PRN recurrent variable decelerations      Administer a 500 -1000 ml IV fluid bolus as needed for      Assess fundal height/uterine firmness, lochia, perineum      Notify Physician      Notify OB care provider      Notify physician of cervical change or lack of change      Notify physician for excessive uterine activity      Notify physician for Fetal Heart Tones consistent with Category II or Category III tracing      Notify Pediatrician after delivery      Notify Nursery / Level II Nursery      Notify Nursery / Level II Nursery      Abdominal prep for  Section      Chlorhexidine (CHG) 2% Wipes      Chlorohexidine Gluconate Bath      Vital signs every 15 minutes      Vital signs every 15 minutes      CBC with Auto Differential      Comprehensive metabolic panel      Type & Screen      Syphilis Antibody (RPR)      HIV 1/2 Ag/Ab (4th Gen)      Hepatitis B surface antigen      POCT Cord Blood Gas Umbilical Artery Cord Gas      POCT Cord Blood Gas Umbilical Vein Cord Gas      Inpatient consult to Anesthesiology      Full code      Admit to Inpatient      Saline lock IV      Perform baseline 20 minute non stress test - proceed with other orders if  reactive criteria met.      Maternal vital signs, fetal and uterine assessments after each dose and hourly      Electronic fetal monitoring      May ambulate 30 minutes after insertion of Misoprostol (Cytotec)      Withhold Misoprostol (Cytotec) dosing:      Oxytocin should be delayed until at least 4 hours after the last Misoprostol (Cytotec) dose administered      BREAST PUMP FOR HOME USE      3. Frequency of urination  R35.0 788.41       4. Gestational edema in third trimester  O12.03 646.13 POCT URINALYSIS W/O SCOPE      US today CRUZ:14.5cm BPP 8/8 Vertex  NST reactive, Cat 1 tracing    No pregnancy checklist tasks were completed during this visit, and no tasks are pending completion.    -Benefits of breastfeeding   -Rooming In and Skin to Skin    P: Bleeding, daily fetal kick counts, and  labor/labor precautions discussed.    Questions answered to desired level of satisfaction  Verbalized understanding to all information and instructions provided.  Admit for Induction 24 @ 8pm  Cytotec q4h initially  Discussed POC with pt and she is in agreement and wishes to proceed.     Mirlande Azul CNM, WHNP-BC

## 2024-05-07 ENCOUNTER — ANESTHESIA (OUTPATIENT)
Dept: OBSTETRICS AND GYNECOLOGY | Facility: HOSPITAL | Age: 24
End: 2024-05-07
Payer: MEDICAID

## 2024-05-07 ENCOUNTER — ANESTHESIA EVENT (OUTPATIENT)
Dept: OBSTETRICS AND GYNECOLOGY | Facility: HOSPITAL | Age: 24
End: 2024-05-07
Payer: MEDICAID

## 2024-05-07 PROBLEM — O48.0 POST-TERM PREGNANCY, 40-42 WEEKS OF GESTATION: Status: ACTIVE | Noted: 2024-05-07

## 2024-05-07 PROBLEM — O99.213 OBESITY AFFECTING PREGNANCY IN THIRD TRIMESTER: Status: ACTIVE | Noted: 2024-05-07

## 2024-05-07 PROBLEM — Z34.90 ENCOUNTER FOR INDUCTION OF LABOR: Status: ACTIVE | Noted: 2024-05-07

## 2024-05-07 PROCEDURE — 63600175 PHARM REV CODE 636 W HCPCS: Performed by: ADVANCED PRACTICE MIDWIFE

## 2024-05-07 PROCEDURE — 01968 ANES/ANALG CS DLVR NEURAXIAL: CPT | Mod: QX,TH,CRNA, | Performed by: NURSE ANESTHETIST, CERTIFIED REGISTERED

## 2024-05-07 PROCEDURE — 37000009 HC ANESTHESIA EA ADD 15 MINS: Performed by: STUDENT IN AN ORGANIZED HEALTH CARE EDUCATION/TRAINING PROGRAM

## 2024-05-07 PROCEDURE — 71000033 HC RECOVERY, INTIAL HOUR: Performed by: STUDENT IN AN ORGANIZED HEALTH CARE EDUCATION/TRAINING PROGRAM

## 2024-05-07 PROCEDURE — C1751 CATH, INF, PER/CENT/MIDLINE: HCPCS | Performed by: ANESTHESIOLOGY

## 2024-05-07 PROCEDURE — 25000003 PHARM REV CODE 250: Performed by: ADVANCED PRACTICE MIDWIFE

## 2024-05-07 PROCEDURE — 27201423 OPTIME MED/SURG SUP & DEVICES STERILE SUPPLY: Performed by: STUDENT IN AN ORGANIZED HEALTH CARE EDUCATION/TRAINING PROGRAM

## 2024-05-07 PROCEDURE — 25000003 PHARM REV CODE 250: Performed by: STUDENT IN AN ORGANIZED HEALTH CARE EDUCATION/TRAINING PROGRAM

## 2024-05-07 PROCEDURE — 59514 CESAREAN DELIVERY ONLY: CPT | Mod: AS,TH,, | Performed by: ADVANCED PRACTICE MIDWIFE

## 2024-05-07 PROCEDURE — 27000716 HC OXISENSOR PROBE, ANY SIZE: Performed by: ANESTHESIOLOGY

## 2024-05-07 PROCEDURE — 36004724 HC OB OR TIME LEV III - 1ST 15 MIN: Performed by: STUDENT IN AN ORGANIZED HEALTH CARE EDUCATION/TRAINING PROGRAM

## 2024-05-07 PROCEDURE — 62326 NJX INTERLAMINAR LMBR/SAC: CPT | Mod: AA | Performed by: ANESTHESIOLOGY

## 2024-05-07 PROCEDURE — 63600175 PHARM REV CODE 636 W HCPCS: Performed by: ANESTHESIOLOGY

## 2024-05-07 PROCEDURE — 01968 ANES/ANALG CS DLVR NEURAXIAL: CPT | Mod: QY,ANES,TH, | Performed by: ANESTHESIOLOGY

## 2024-05-07 PROCEDURE — 59514 CESAREAN DELIVERY ONLY: CPT | Mod: QX,TH,CRNA, | Performed by: NURSE ANESTHETIST, CERTIFIED REGISTERED

## 2024-05-07 PROCEDURE — 37000008 HC ANESTHESIA 1ST 15 MINUTES: Performed by: STUDENT IN AN ORGANIZED HEALTH CARE EDUCATION/TRAINING PROGRAM

## 2024-05-07 PROCEDURE — 63600175 PHARM REV CODE 636 W HCPCS: Performed by: STUDENT IN AN ORGANIZED HEALTH CARE EDUCATION/TRAINING PROGRAM

## 2024-05-07 PROCEDURE — 10907ZC DRAINAGE OF AMNIOTIC FLUID, THERAPEUTIC FROM PRODUCTS OF CONCEPTION, VIA NATURAL OR ARTIFICIAL OPENING: ICD-10-PCS | Performed by: STUDENT IN AN ORGANIZED HEALTH CARE EDUCATION/TRAINING PROGRAM

## 2024-05-07 PROCEDURE — 11000001 HC ACUTE MED/SURG PRIVATE ROOM

## 2024-05-07 PROCEDURE — 25000003 PHARM REV CODE 250: Performed by: ANESTHESIOLOGY

## 2024-05-07 PROCEDURE — 27000284 HC CANNULA NASAL: Performed by: ANESTHESIOLOGY

## 2024-05-07 PROCEDURE — 59514 CESAREAN DELIVERY ONLY: CPT | Mod: AA,ANES,TH, | Performed by: ANESTHESIOLOGY

## 2024-05-07 PROCEDURE — 36004725 HC OB OR TIME LEV III - EA ADD 15 MIN: Performed by: STUDENT IN AN ORGANIZED HEALTH CARE EDUCATION/TRAINING PROGRAM

## 2024-05-07 PROCEDURE — 27000181 HC CABLE, IUPC

## 2024-05-07 PROCEDURE — 71000039 HC RECOVERY, EACH ADD'L HOUR: Performed by: STUDENT IN AN ORGANIZED HEALTH CARE EDUCATION/TRAINING PROGRAM

## 2024-05-07 RX ORDER — OXYTOCIN/RINGER'S LACTATE 30/500 ML
334 PLASTIC BAG, INJECTION (ML) INTRAVENOUS ONCE AS NEEDED
Status: DISCONTINUED | OUTPATIENT
Start: 2024-05-07 | End: 2024-05-09 | Stop reason: HOSPADM

## 2024-05-07 RX ORDER — OXYTOCIN 10 [USP'U]/ML
INJECTION, SOLUTION INTRAMUSCULAR; INTRAVENOUS
Status: DISCONTINUED | OUTPATIENT
Start: 2024-05-07 | End: 2024-05-07

## 2024-05-07 RX ORDER — DIPHENHYDRAMINE HYDROCHLORIDE 50 MG/ML
INJECTION INTRAMUSCULAR; INTRAVENOUS
Status: DISCONTINUED | OUTPATIENT
Start: 2024-05-07 | End: 2024-05-07

## 2024-05-07 RX ORDER — ROPIVACAINE HYDROCHLORIDE 5 MG/ML
INJECTION, SOLUTION EPIDURAL; INFILTRATION; PERINEURAL
Status: COMPLETED | OUTPATIENT
Start: 2024-05-07 | End: 2024-05-07

## 2024-05-07 RX ORDER — FENTANYL/ROPIVACAINE/NS/PF 2MCG/ML-.2
PLASTIC BAG, INJECTION (ML) INJECTION CONTINUOUS
Status: DISCONTINUED | OUTPATIENT
Start: 2024-05-07 | End: 2024-05-08

## 2024-05-07 RX ORDER — SODIUM CITRATE AND CITRIC ACID MONOHYDRATE 334; 500 MG/5ML; MG/5ML
30 SOLUTION ORAL ONCE
Status: COMPLETED | OUTPATIENT
Start: 2024-05-07 | End: 2024-05-07

## 2024-05-07 RX ORDER — CARBOPROST TROMETHAMINE 250 UG/ML
250 INJECTION, SOLUTION INTRAMUSCULAR
Status: DISCONTINUED | OUTPATIENT
Start: 2024-05-08 | End: 2024-05-09 | Stop reason: HOSPADM

## 2024-05-07 RX ORDER — DIPHENHYDRAMINE HYDROCHLORIDE 50 MG/ML
25 INJECTION INTRAMUSCULAR; INTRAVENOUS EVERY 6 HOURS PRN
Status: CANCELLED | OUTPATIENT
Start: 2024-05-07

## 2024-05-07 RX ORDER — MUPIROCIN 20 MG/G
OINTMENT TOPICAL 2 TIMES DAILY
Status: DISCONTINUED | OUTPATIENT
Start: 2024-05-08 | End: 2024-05-08

## 2024-05-07 RX ORDER — OXYCODONE HYDROCHLORIDE 5 MG/1
10 TABLET ORAL EVERY 4 HOURS PRN
Status: DISPENSED | OUTPATIENT
Start: 2024-05-07 | End: 2024-05-08

## 2024-05-07 RX ORDER — FAMOTIDINE 10 MG/ML
20 INJECTION INTRAVENOUS ONCE
Status: COMPLETED | OUTPATIENT
Start: 2024-05-07 | End: 2024-05-07

## 2024-05-07 RX ORDER — ENOXAPARIN SODIUM 100 MG/ML
40 INJECTION SUBCUTANEOUS EVERY 24 HOURS
Status: DISCONTINUED | OUTPATIENT
Start: 2024-05-08 | End: 2024-05-09 | Stop reason: HOSPADM

## 2024-05-07 RX ORDER — EPHEDRINE SULFATE 50 MG/ML
10 INJECTION, SOLUTION INTRAVENOUS ONCE AS NEEDED
Status: DISCONTINUED | OUTPATIENT
Start: 2024-05-07 | End: 2024-05-09 | Stop reason: HOSPADM

## 2024-05-07 RX ORDER — SODIUM CHLORIDE 0.9 % (FLUSH) 0.9 %
10 SYRINGE (ML) INJECTION
Status: DISCONTINUED | OUTPATIENT
Start: 2024-05-08 | End: 2024-05-09 | Stop reason: HOSPADM

## 2024-05-07 RX ORDER — ONDANSETRON HYDROCHLORIDE 2 MG/ML
4 INJECTION, SOLUTION INTRAVENOUS EVERY 6 HOURS PRN
Status: ACTIVE | OUTPATIENT
Start: 2024-05-07 | End: 2024-05-08

## 2024-05-07 RX ORDER — PROCHLORPERAZINE EDISYLATE 5 MG/ML
5 INJECTION INTRAMUSCULAR; INTRAVENOUS EVERY 6 HOURS PRN
Status: DISCONTINUED | OUTPATIENT
Start: 2024-05-07 | End: 2024-05-09 | Stop reason: HOSPADM

## 2024-05-07 RX ORDER — AMOXICILLIN 250 MG
1 CAPSULE ORAL NIGHTLY PRN
Status: DISCONTINUED | OUTPATIENT
Start: 2024-05-08 | End: 2024-05-09 | Stop reason: HOSPADM

## 2024-05-07 RX ORDER — OXYCODONE AND ACETAMINOPHEN 5; 325 MG/1; MG/1
1 TABLET ORAL EVERY 4 HOURS PRN
Status: DISCONTINUED | OUTPATIENT
Start: 2024-05-08 | End: 2024-05-09 | Stop reason: HOSPADM

## 2024-05-07 RX ORDER — MISOPROSTOL 200 UG/1
800 TABLET ORAL ONCE AS NEEDED
Status: DISCONTINUED | OUTPATIENT
Start: 2024-05-07 | End: 2024-05-09 | Stop reason: HOSPADM

## 2024-05-07 RX ORDER — MORPHINE SULFATE 1 MG/ML
INJECTION, SOLUTION EPIDURAL; INTRATHECAL; INTRAVENOUS
Status: DISCONTINUED | OUTPATIENT
Start: 2024-05-07 | End: 2024-05-07

## 2024-05-07 RX ORDER — DIPHENOXYLATE HYDROCHLORIDE AND ATROPINE SULFATE 2.5; .025 MG/1; MG/1
2 TABLET ORAL EVERY 6 HOURS PRN
Status: DISCONTINUED | OUTPATIENT
Start: 2024-05-08 | End: 2024-05-09 | Stop reason: HOSPADM

## 2024-05-07 RX ORDER — KETOROLAC TROMETHAMINE 30 MG/ML
INJECTION, SOLUTION INTRAMUSCULAR; INTRAVENOUS
Status: DISCONTINUED | OUTPATIENT
Start: 2024-05-07 | End: 2024-05-07

## 2024-05-07 RX ORDER — HYDROMORPHONE HYDROCHLORIDE 2 MG/ML
0.5 INJECTION, SOLUTION INTRAMUSCULAR; INTRAVENOUS; SUBCUTANEOUS EVERY 5 MIN PRN
Status: CANCELLED | OUTPATIENT
Start: 2024-05-07

## 2024-05-07 RX ORDER — ONDANSETRON HYDROCHLORIDE 2 MG/ML
INJECTION, SOLUTION INTRAVENOUS
Status: DISCONTINUED | OUTPATIENT
Start: 2024-05-07 | End: 2024-05-07

## 2024-05-07 RX ORDER — KETOROLAC TROMETHAMINE 30 MG/ML
30 INJECTION, SOLUTION INTRAMUSCULAR; INTRAVENOUS EVERY 6 HOURS
Status: DISCONTINUED | OUTPATIENT
Start: 2024-05-08 | End: 2024-05-08

## 2024-05-07 RX ORDER — OXYTOCIN/RINGER'S LACTATE 30/500 ML
95 PLASTIC BAG, INJECTION (ML) INTRAVENOUS ONCE
Status: DISCONTINUED | OUTPATIENT
Start: 2024-05-08 | End: 2024-05-08

## 2024-05-07 RX ORDER — BISACODYL 10 MG/1
10 SUPPOSITORY RECTAL ONCE AS NEEDED
Status: DISCONTINUED | OUTPATIENT
Start: 2024-05-08 | End: 2024-05-09 | Stop reason: HOSPADM

## 2024-05-07 RX ORDER — LIDOCAINE HYDROCHLORIDE 20 MG/ML
INJECTION, SOLUTION EPIDURAL; INFILTRATION; INTRACAUDAL; PERINEURAL
Status: DISCONTINUED | OUTPATIENT
Start: 2024-05-07 | End: 2024-05-07

## 2024-05-07 RX ORDER — KETOROLAC TROMETHAMINE 30 MG/ML
30 INJECTION, SOLUTION INTRAMUSCULAR; INTRAVENOUS EVERY 8 HOURS
Status: DISCONTINUED | OUTPATIENT
Start: 2024-05-08 | End: 2024-05-08

## 2024-05-07 RX ORDER — MORPHINE SULFATE 10 MG/ML
4 INJECTION INTRAMUSCULAR; INTRAVENOUS; SUBCUTANEOUS EVERY 5 MIN PRN
Status: CANCELLED | OUTPATIENT
Start: 2024-05-07

## 2024-05-07 RX ORDER — OXYTOCIN/RINGER'S LACTATE 30/500 ML
95 PLASTIC BAG, INJECTION (ML) INTRAVENOUS ONCE AS NEEDED
Status: DISCONTINUED | OUTPATIENT
Start: 2024-05-07 | End: 2024-05-09 | Stop reason: HOSPADM

## 2024-05-07 RX ORDER — IBUPROFEN 800 MG/1
800 TABLET ORAL EVERY 8 HOURS
Status: DISCONTINUED | OUTPATIENT
Start: 2024-05-09 | End: 2024-05-08

## 2024-05-07 RX ORDER — PHENYLEPHRINE HYDROCHLORIDE 10 MG/ML
INJECTION INTRAVENOUS
Status: DISCONTINUED | OUTPATIENT
Start: 2024-05-07 | End: 2024-05-07

## 2024-05-07 RX ORDER — OXYCODONE HYDROCHLORIDE 5 MG/1
5 TABLET ORAL EVERY 4 HOURS PRN
Status: ACTIVE | OUTPATIENT
Start: 2024-05-07 | End: 2024-05-08

## 2024-05-07 RX ORDER — METHYLERGONOVINE MALEATE 0.2 MG/ML
200 INJECTION INTRAVENOUS ONCE AS NEEDED
Status: DISCONTINUED | OUTPATIENT
Start: 2024-05-08 | End: 2024-05-09 | Stop reason: HOSPADM

## 2024-05-07 RX ORDER — OXYCODONE AND ACETAMINOPHEN 10; 325 MG/1; MG/1
1 TABLET ORAL EVERY 4 HOURS PRN
Status: DISCONTINUED | OUTPATIENT
Start: 2024-05-08 | End: 2024-05-09 | Stop reason: HOSPADM

## 2024-05-07 RX ORDER — TRANEXAMIC ACID 10 MG/ML
1000 INJECTION, SOLUTION INTRAVENOUS EVERY 30 MIN PRN
Status: DISCONTINUED | OUTPATIENT
Start: 2024-05-07 | End: 2024-05-09 | Stop reason: HOSPADM

## 2024-05-07 RX ORDER — DOCUSATE SODIUM 100 MG/1
200 CAPSULE, LIQUID FILLED ORAL 2 TIMES DAILY
Status: DISCONTINUED | OUTPATIENT
Start: 2024-05-08 | End: 2024-05-09 | Stop reason: HOSPADM

## 2024-05-07 RX ORDER — OXYTOCIN 10 [USP'U]/ML
10 INJECTION, SOLUTION INTRAMUSCULAR; INTRAVENOUS ONCE AS NEEDED
Status: DISCONTINUED | OUTPATIENT
Start: 2024-05-08 | End: 2024-05-09 | Stop reason: HOSPADM

## 2024-05-07 RX ORDER — ONDANSETRON HYDROCHLORIDE 2 MG/ML
4 INJECTION, SOLUTION INTRAVENOUS ONCE
Status: COMPLETED | OUTPATIENT
Start: 2024-05-07 | End: 2024-05-07

## 2024-05-07 RX ORDER — ONDANSETRON HYDROCHLORIDE 2 MG/ML
4 INJECTION, SOLUTION INTRAVENOUS DAILY PRN
Status: CANCELLED | OUTPATIENT
Start: 2024-05-07

## 2024-05-07 RX ORDER — SIMETHICONE 80 MG
1 TABLET,CHEWABLE ORAL EVERY 6 HOURS PRN
Status: DISCONTINUED | OUTPATIENT
Start: 2024-05-08 | End: 2024-05-09 | Stop reason: HOSPADM

## 2024-05-07 RX ORDER — MEPERIDINE HYDROCHLORIDE 25 MG/ML
25 INJECTION INTRAMUSCULAR; INTRAVENOUS; SUBCUTANEOUS ONCE AS NEEDED
Status: CANCELLED | OUTPATIENT
Start: 2024-05-07 | End: 2024-05-08

## 2024-05-07 RX ORDER — DIPHENHYDRAMINE HCL 25 MG
25 CAPSULE ORAL EVERY 4 HOURS PRN
Status: DISCONTINUED | OUTPATIENT
Start: 2024-05-08 | End: 2024-05-09 | Stop reason: HOSPADM

## 2024-05-07 RX ORDER — ROPIVACAINE HYDROCHLORIDE 5 MG/ML
15 INJECTION, SOLUTION EPIDURAL; INFILTRATION; PERINEURAL ONCE
Status: COMPLETED | OUTPATIENT
Start: 2024-05-07 | End: 2024-05-07

## 2024-05-07 RX ORDER — PRENATAL WITH FERROUS FUM AND FOLIC ACID 3080; 920; 120; 400; 22; 1.84; 3; 20; 10; 1; 12; 200; 27; 25; 2 [IU]/1; [IU]/1; MG/1; [IU]/1; MG/1; MG/1; MG/1; MG/1; MG/1; MG/1; UG/1; MG/1; MG/1; MG/1; MG/1
1 TABLET ORAL DAILY
Status: DISCONTINUED | OUTPATIENT
Start: 2024-05-08 | End: 2024-05-09 | Stop reason: HOSPADM

## 2024-05-07 RX ORDER — IPRATROPIUM BROMIDE AND ALBUTEROL SULFATE 2.5; .5 MG/3ML; MG/3ML
3 SOLUTION RESPIRATORY (INHALATION) ONCE AS NEEDED
Status: CANCELLED | OUTPATIENT
Start: 2024-05-07 | End: 2035-10-04

## 2024-05-07 RX ORDER — ONDANSETRON 4 MG/1
8 TABLET, ORALLY DISINTEGRATING ORAL EVERY 8 HOURS PRN
Status: DISCONTINUED | OUTPATIENT
Start: 2024-05-08 | End: 2024-05-09 | Stop reason: HOSPADM

## 2024-05-07 RX ORDER — ACETAMINOPHEN 325 MG/1
650 TABLET ORAL EVERY 6 HOURS
Status: DISCONTINUED | OUTPATIENT
Start: 2024-05-08 | End: 2024-05-08

## 2024-05-07 RX ORDER — ACETAMINOPHEN 500 MG
1000 TABLET ORAL EVERY 6 HOURS PRN
Status: DISCONTINUED | OUTPATIENT
Start: 2024-05-07 | End: 2024-05-09 | Stop reason: HOSPADM

## 2024-05-07 RX ORDER — OXYTOCIN/RINGER'S LACTATE 30/500 ML
0-32 PLASTIC BAG, INJECTION (ML) INTRAVENOUS CONTINUOUS
Status: DISCONTINUED | OUTPATIENT
Start: 2024-05-07 | End: 2024-05-08

## 2024-05-07 RX ORDER — ADHESIVE BANDAGE
30 BANDAGE TOPICAL 2 TIMES DAILY PRN
Status: DISCONTINUED | OUTPATIENT
Start: 2024-05-08 | End: 2024-05-09 | Stop reason: HOSPADM

## 2024-05-07 RX ORDER — DIPHENHYDRAMINE HYDROCHLORIDE 50 MG/ML
12.5 INJECTION INTRAMUSCULAR; INTRAVENOUS EVERY 4 HOURS PRN
Status: DISCONTINUED | OUTPATIENT
Start: 2024-05-07 | End: 2024-05-09 | Stop reason: HOSPADM

## 2024-05-07 RX ORDER — MISOPROSTOL 200 UG/1
800 TABLET ORAL ONCE AS NEEDED
Status: DISCONTINUED | OUTPATIENT
Start: 2024-05-08 | End: 2024-05-09 | Stop reason: HOSPADM

## 2024-05-07 RX ADMIN — OXYTOCIN 30 UNITS: 10 INJECTION, SOLUTION INTRAMUSCULAR; INTRAVENOUS at 10:05

## 2024-05-07 RX ADMIN — ROPIVACAINE HYDROCHLORIDE 15 ML: 5 INJECTION EPIDURAL; INFILTRATION; PERINEURAL at 05:05

## 2024-05-07 RX ADMIN — ROPIVACAINE HYDROCHLORIDE 5 ML: 5 INJECTION, SOLUTION EPIDURAL; INFILTRATION; PERINEURAL at 06:05

## 2024-05-07 RX ADMIN — MORPHINE SULFATE 2.5 MG: 1 INJECTION, SOLUTION EPIDURAL; INTRATHECAL; INTRAVENOUS at 10:05

## 2024-05-07 RX ADMIN — OXYTOCIN 2 MILLI-UNITS/MIN: 10 INJECTION, SOLUTION INTRAMUSCULAR; INTRAVENOUS at 10:05

## 2024-05-07 RX ADMIN — LIDOCAINE HYDROCHLORIDE 100 MG: 20 INJECTION, SOLUTION INTRAVENOUS at 09:05

## 2024-05-07 RX ADMIN — ONDANSETRON 4 MG: 2 INJECTION INTRAMUSCULAR; INTRAVENOUS at 09:05

## 2024-05-07 RX ADMIN — ROPIVACAINE HYDROCHLORIDE 1000 MCG: 10 INJECTION, SOLUTION EPIDURAL at 05:05

## 2024-05-07 RX ADMIN — SODIUM CHLORIDE, POTASSIUM CHLORIDE, SODIUM LACTATE AND CALCIUM CHLORIDE: 600; 310; 30; 20 INJECTION, SOLUTION INTRAVENOUS at 10:05

## 2024-05-07 RX ADMIN — MISOPROSTOL 25 MCG: 100 TABLET ORAL at 12:05

## 2024-05-07 RX ADMIN — ACETAMINOPHEN 1000 MG: 500 TABLET ORAL at 07:05

## 2024-05-07 RX ADMIN — FAMOTIDINE 20 MG: 10 INJECTION, SOLUTION INTRAVENOUS at 09:05

## 2024-05-07 RX ADMIN — SODIUM CHLORIDE, POTASSIUM CHLORIDE, SODIUM LACTATE AND CALCIUM CHLORIDE: 600; 310; 30; 20 INJECTION, SOLUTION INTRAVENOUS at 01:05

## 2024-05-07 RX ADMIN — KETOROLAC TROMETHAMINE 30 MG: 30 INJECTION, SOLUTION INTRAMUSCULAR at 10:05

## 2024-05-07 RX ADMIN — TRANEXAMIC ACID 1000 MG: 10 INJECTION, SOLUTION INTRAVENOUS at 10:05

## 2024-05-07 RX ADMIN — LIDOCAINE HYDROCHLORIDE 200 MG: 20 INJECTION, SOLUTION INTRAVENOUS at 09:05

## 2024-05-07 RX ADMIN — METHYLERGONOVINE MALEATE 200 MCG: 0.2 INJECTION, SOLUTION INTRAMUSCULAR; INTRAVENOUS at 10:05

## 2024-05-07 RX ADMIN — OXYTOCIN 10 UNITS: 10 INJECTION, SOLUTION INTRAMUSCULAR; INTRAVENOUS at 10:05

## 2024-05-07 RX ADMIN — CEFAZOLIN 2 G: 2 INJECTION, POWDER, FOR SOLUTION INTRAMUSCULAR; INTRAVENOUS at 09:05

## 2024-05-07 RX ADMIN — SODIUM CHLORIDE, POTASSIUM CHLORIDE, SODIUM LACTATE AND CALCIUM CHLORIDE: 600; 310; 30; 20 INJECTION, SOLUTION INTRAVENOUS at 05:05

## 2024-05-07 RX ADMIN — PHENYLEPHRINE HYDROCHLORIDE 100 MCG: 10 INJECTION INTRAVENOUS at 10:05

## 2024-05-07 RX ADMIN — DIPHENHYDRAMINE HYDROCHLORIDE 12.5 MG: 50 INJECTION, SOLUTION INTRAMUSCULAR; INTRAVENOUS at 10:05

## 2024-05-07 RX ADMIN — BUTORPHANOL TARTRATE 1 MG: 2 INJECTION, SOLUTION INTRAMUSCULAR; INTRAVENOUS at 03:05

## 2024-05-07 RX ADMIN — ACETAMINOPHEN 1000 MG: 500 TABLET ORAL at 08:05

## 2024-05-07 RX ADMIN — SODIUM CITRATE AND CITRIC ACID MONOHYDRATE 30 ML: 500; 334 SOLUTION ORAL at 09:05

## 2024-05-07 RX ADMIN — PHENYLEPHRINE HYDROCHLORIDE 300 MCG: 10 INJECTION INTRAVENOUS at 10:05

## 2024-05-07 RX ADMIN — BUTORPHANOL TARTRATE 2 MG: 2 INJECTION, SOLUTION INTRAMUSCULAR; INTRAVENOUS at 11:05

## 2024-05-07 NOTE — PLAN OF CARE
Problem: Adult Inpatient Plan of Care  Goal: Plan of Care Review  2024 by Mima Escamilla RN  Outcome: Progressing  2024 by Mima Escamilla RN  Outcome: Progressing  Goal: Patient-Specific Goal (Individualized)  2024 by Mima Escamilla RN  Outcome: Progressing  2024 by Mima Escamilla RN  Outcome: Progressing  Goal: Absence of Hospital-Acquired Illness or Injury  2024 by Mima Escamilla RN  Outcome: Progressing  2024 by Mima Escamilla RN  Outcome: Progressing  Goal: Optimal Comfort and Wellbeing  2024 by Mima Escamilla RN  Outcome: Progressing  2024 by Mima Escamilla RN  Outcome: Progressing  Goal: Readiness for Transition of Care  2024 by Mima Escamilla RN  Outcome: Progressing  2024 by Mima Escamilla RN  Outcome: Progressing     Problem: Bariatric Environmental Safety  Goal: Safety Maintained with Care  2024 by Mima Escamilla RN  Outcome: Progressing  2024 by Mima Escamilla RN  Outcome: Progressing     Problem:  Fall Injury Risk  Goal: Absence of Fall, Infant Drop and Related Injury  2024 by Mima Escamilla, RN  Outcome: Progressing  2024 by Mima Escamilla RN  Outcome: Progressing     Problem: Infection  Goal: Absence of Infection Signs and Symptoms  2024 by Mima Escamilla RN  Outcome: Progressing  2024 by Mima Escamilla RN  Outcome: Progressing     Problem: Labor  Goal: Hemostasis  2024 by Mima Escamilla RN  Outcome: Progressing  2024 by Mima Escamilla RN  Outcome: Progressing  Goal: Stable Fetal Wellbeing  2024 by Mima Escamilla RN  Outcome: Progressing  2024 by Mima Escamilla, RN  Outcome: Progressing  Goal: Effective Progression to Delivery  2024 by Mima Escamilla, RN  Outcome: Progressing  2024 by Andi,  Mima DONIS, PEDRO  Outcome: Progressing  Goal: Absence of Infection Signs and Symptoms  5/6/2024 2107 by Mima Escamilla RN  Outcome: Progressing  5/6/2024 2107 by Mima Escamilla RN  Outcome: Progressing  Goal: Acceptable Pain Control  5/6/2024 2107 by Mima Escamilla, RN  Outcome: Progressing  5/6/2024 2107 by Mima Escamilla, RN  Outcome: Progressing  Goal: Normal Uterine Contraction Pattern  5/6/2024 2107 by Mima Escamilla, RN  Outcome: Progressing  5/6/2024 2107 by Mima Escamilla, RN  Outcome: Progressing

## 2024-05-07 NOTE — PLAN OF CARE
Problem: Adult Inpatient Plan of Care  Goal: Plan of Care Review  Outcome: Progressing  Goal: Patient-Specific Goal (Individualized)  Outcome: Progressing  Goal: Absence of Hospital-Acquired Illness or Injury  Outcome: Progressing  Goal: Optimal Comfort and Wellbeing  Outcome: Progressing  Goal: Readiness for Transition of Care  Outcome: Progressing     Problem: Bariatric Environmental Safety  Goal: Safety Maintained with Care  Outcome: Progressing     Problem:  Fall Injury Risk  Goal: Absence of Fall, Infant Drop and Related Injury  Outcome: Progressing     Problem: Infection  Goal: Absence of Infection Signs and Symptoms  Outcome: Progressing     Problem: Labor  Goal: Hemostasis  Outcome: Progressing  Goal: Stable Fetal Wellbeing  Outcome: Progressing  Goal: Effective Progression to Delivery  Outcome: Progressing  Goal: Absence of Infection Signs and Symptoms  Outcome: Progressing  Goal: Acceptable Pain Control  Outcome: Progressing  Goal: Normal Uterine Contraction Pattern  Outcome: Progressing

## 2024-05-07 NOTE — ANESTHESIA PROCEDURE NOTES
Epidural    Patient location during procedure: OB   Reason for block: primary anesthetic   Reason for block: labor analgesia requested by patient and obstetrician  Diagnosis: iup, iol   Start time: 5/7/2024 5:50 PM  Timeout: 5/7/2024 5:50 PM  End time: 5/7/2024 6:10 PM    Staffing  Performing Provider: Herman Wood DO  Authorizing Provider: Herman Wood DO    Staffing  Performed by: Herman Wood DO  Authorized by: Herman Wood DO        Preanesthetic Checklist  Completed: patient identified, IV checked, site marked, risks and benefits discussed, surgical consent, monitors and equipment checked, pre-op evaluation, timeout performed, anesthesia consent given, hand hygiene performed and patient being monitored  Preparation  Patient position: sitting  Prep: ChloraPrep  Patient monitoring: ECG, Pulse Ox and Blood Pressure  Reason for block: primary anesthetic   Epidural  Skin Anesthetic: lidocaine 1%  Skin Wheal: 2 mL  Administration type: continuous  Approach: midline  Interspace: L4-5    Injection technique: JOEL saline  Needle and Epidural Catheter  Needle type: Tuohy   Needle gauge: 18  Needle length: 3.5 inches  Needle insertion depth: 9 cm  Catheter type: end hole  Catheter size: 20 G  Catheter at skin depth: 13 cm  Insertion Attempts: 1  Test dose: 3 mL of lidocaine 1.5% with Epi 1-to-200,000  Additional Documentation: incremental injection, no paresthesia on injection and negative aspiration for heme and CSF  Needle localization: anatomical landmarks  Assessment  Ease of block: moderate  Patient's tolerance of the procedure: comfortable throughout block No inadvertent dural puncture with Tuohy.  Dural puncture not performed with spinal needle    Medications:    Medications: ROPIvacaine (NAROPIN) injection 0.5% - Epidural   5 mL - 5/7/2024 6:05:00 PM

## 2024-05-07 NOTE — ANESTHESIA PREPROCEDURE EVALUATION
05/07/2024  Jaida Zacarias is a 24 y.o., female.      Pre-op Assessment    I have reviewed the Patient Summary Reports.     I have reviewed the Nursing Notes. I have reviewed the NPO Status.   I have reviewed the Medications.     Review of Systems  Anesthesia Hx:  No problems with previous Anesthesia             Denies Family Hx of Anesthesia complications.    Denies Personal Hx of Anesthesia complications.                    Social:  No Alcohol Use, Non-Smoker       Cardiovascular:  Exercise tolerance: good                                           OB/GYN/PEDS:  G1 elective IOL           Endocrine:        Morbid Obesity / BMI > 40  Psych:  Psychiatric History   ADHD               Physical Exam  General: Well nourished, Cooperative and Alert    Airway:  Mallampati: II   Mouth Opening: Normal  TM Distance: Normal  Tongue: Normal  Neck ROM: Normal ROM    Dental:  Intact    Chest/Lungs:  Clear to auscultation, Normal Respiratory Rate    Heart:  Rate: Normal  Rhythm: Regular Rhythm        Chemistry        Component Value Date/Time     05/06/2024 2040    K 3.9 05/06/2024 2040     (H) 05/06/2024 2040    CO2 19 (L) 05/06/2024 2040    BUN 6 (L) 05/06/2024 2040    CREATININE 0.54 (L) 05/06/2024 2040    GLU 96 05/06/2024 2040        Component Value Date/Time    CALCIUM 9.0 05/06/2024 2040    ALKPHOS 210 (H) 05/06/2024 2040    AST 22 05/06/2024 2040    ALT 19 05/06/2024 2040    BILITOT 0.5 05/06/2024 2040        Lab Results   Component Value Date    WBC 11.75 (H) 05/06/2024    HGB 12.1 05/06/2024    HCT 37.4 (L) 05/06/2024     05/06/2024     No results found for this or any previous visit.      Anesthesia Plan  Type of Anesthesia, risks & benefits discussed:    Anesthesia Type: Epidural  Intra-op Monitoring Plan: Standard ASA Monitors  Post Op Pain Control Plan: epidural analgesia  Informed  Consent: Informed consent signed with the Patient and all parties understand the risks and agree with anesthesia plan.  All questions answered. Patient consented to blood products? Yes  ASA Score: 3  Day of Surgery Review of History & Physical: H&P Update referred to the surgeon/provider.I have interviewed and examined the patient. I have reviewed the patient's H&P dated: There are no significant changes.     Ready For Surgery From Anesthesia Perspective.     .

## 2024-05-08 PROBLEM — Z34.90 ENCOUNTER FOR INDUCTION OF LABOR: Status: RESOLVED | Noted: 2024-05-07 | Resolved: 2024-05-08

## 2024-05-08 PROBLEM — O48.0 41 WEEKS GESTATION OF PREGNANCY: Status: RESOLVED | Noted: 2024-05-02 | Resolved: 2024-05-08

## 2024-05-08 PROBLEM — Z3A.41 41 WEEKS GESTATION OF PREGNANCY: Status: RESOLVED | Noted: 2024-05-02 | Resolved: 2024-05-08

## 2024-05-08 LAB
BASOPHILS # BLD AUTO: 0.05 K/UL (ref 0–0.2)
BASOPHILS NFR BLD AUTO: 0.3 % (ref 0–1)
DIFFERENTIAL METHOD BLD: ABNORMAL
EOSINOPHIL # BLD AUTO: 0 K/UL (ref 0–0.5)
EOSINOPHIL NFR BLD AUTO: 0 % (ref 1–4)
ERYTHROCYTE [DISTWIDTH] IN BLOOD BY AUTOMATED COUNT: 13.4 % (ref 11.5–14.5)
HCT VFR BLD AUTO: 34.4 % (ref 38–47)
HGB BLD-MCNC: 11.1 G/DL (ref 12–16)
IMM GRANULOCYTES # BLD AUTO: 0.07 K/UL (ref 0–0.04)
IMM GRANULOCYTES NFR BLD: 0.4 % (ref 0–0.4)
LYMPHOCYTES # BLD AUTO: 2.02 K/UL (ref 1–4.8)
LYMPHOCYTES NFR BLD AUTO: 11.1 % (ref 27–41)
MCH RBC QN AUTO: 27.5 PG (ref 27–31)
MCHC RBC AUTO-ENTMCNC: 32.3 G/DL (ref 32–36)
MCV RBC AUTO: 85.4 FL (ref 80–96)
MONOCYTES # BLD AUTO: 0.72 K/UL (ref 0–0.8)
MONOCYTES NFR BLD AUTO: 4 % (ref 2–6)
MPC BLD CALC-MCNC: 9.8 FL (ref 9.4–12.4)
NEUTROPHILS # BLD AUTO: 15.28 K/UL (ref 1.8–7.7)
NEUTROPHILS NFR BLD AUTO: 84.2 % (ref 53–65)
NRBC # BLD AUTO: 0 X10E3/UL
NRBC, AUTO (.00): 0 %
PLATELET # BLD AUTO: 297 K/UL (ref 150–400)
RBC # BLD AUTO: 4.03 M/UL (ref 4.2–5.4)
WBC # BLD AUTO: 18.14 K/UL (ref 4.5–11)

## 2024-05-08 PROCEDURE — 25000003 PHARM REV CODE 250: Performed by: ANESTHESIOLOGY

## 2024-05-08 PROCEDURE — 63600175 PHARM REV CODE 636 W HCPCS: Performed by: STUDENT IN AN ORGANIZED HEALTH CARE EDUCATION/TRAINING PROGRAM

## 2024-05-08 PROCEDURE — 11000001 HC ACUTE MED/SURG PRIVATE ROOM

## 2024-05-08 PROCEDURE — 27000980 HC MATTRESS, OVERLAY WAFFLE

## 2024-05-08 PROCEDURE — 99900035 HC TECH TIME PER 15 MIN (STAT)

## 2024-05-08 PROCEDURE — 36415 COLL VENOUS BLD VENIPUNCTURE: CPT | Performed by: ADVANCED PRACTICE MIDWIFE

## 2024-05-08 PROCEDURE — 51702 INSERT TEMP BLADDER CATH: CPT

## 2024-05-08 PROCEDURE — 27000758 HC SPIROMETER

## 2024-05-08 PROCEDURE — 27000727 HC TRAY FOLEY W-METER 16-18FR

## 2024-05-08 PROCEDURE — 59515 CESAREAN DELIVERY: CPT | Mod: TH,,, | Performed by: STUDENT IN AN ORGANIZED HEALTH CARE EDUCATION/TRAINING PROGRAM

## 2024-05-08 PROCEDURE — 25000003 PHARM REV CODE 250: Performed by: ADVANCED PRACTICE MIDWIFE

## 2024-05-08 PROCEDURE — 72100002 HC LABOR CARE, 1ST 8 HOURS

## 2024-05-08 PROCEDURE — 63600175 PHARM REV CODE 636 W HCPCS: Performed by: ADVANCED PRACTICE MIDWIFE

## 2024-05-08 PROCEDURE — 25000003 PHARM REV CODE 250: Performed by: STUDENT IN AN ORGANIZED HEALTH CARE EDUCATION/TRAINING PROGRAM

## 2024-05-08 PROCEDURE — 72100003 HC LABOR CARE, EA. ADDL. 8 HRS

## 2024-05-08 PROCEDURE — 85025 COMPLETE CBC W/AUTO DIFF WBC: CPT | Performed by: ADVANCED PRACTICE MIDWIFE

## 2024-05-08 RX ORDER — ENOXAPARIN SODIUM 100 MG/ML
40 INJECTION SUBCUTANEOUS DAILY
Qty: 12 ML | Refills: 0 | Status: SHIPPED | OUTPATIENT
Start: 2024-05-08 | End: 2024-06-07

## 2024-05-08 RX ORDER — OXYCODONE AND ACETAMINOPHEN 5; 325 MG/1; MG/1
1 TABLET ORAL EVERY 4 HOURS PRN
Qty: 20 EACH | Refills: 0 | Status: SHIPPED | OUTPATIENT
Start: 2024-05-08

## 2024-05-08 RX ORDER — IBUPROFEN 800 MG/1
800 TABLET ORAL EVERY 8 HOURS PRN
Qty: 30 TABLET | Refills: 0 | Status: SHIPPED | OUTPATIENT
Start: 2024-05-08

## 2024-05-08 RX ORDER — DOCUSATE SODIUM 100 MG/1
200 CAPSULE, LIQUID FILLED ORAL 2 TIMES DAILY
Qty: 30 CAPSULE | Refills: 0 | Status: SHIPPED | OUTPATIENT
Start: 2024-05-08

## 2024-05-08 RX ORDER — IBUPROFEN 800 MG/1
800 TABLET ORAL EVERY 8 HOURS PRN
Status: DISCONTINUED | OUTPATIENT
Start: 2024-05-08 | End: 2024-05-09 | Stop reason: HOSPADM

## 2024-05-08 RX ADMIN — Medication 1 TABLET: at 10:05

## 2024-05-08 RX ADMIN — OXYCODONE HYDROCHLORIDE AND ACETAMINOPHEN 1 TABLET: 5; 325 TABLET ORAL at 09:05

## 2024-05-08 RX ADMIN — IBUPROFEN 800 MG: 800 TABLET, FILM COATED ORAL at 02:05

## 2024-05-08 RX ADMIN — MUPIROCIN: 20 OINTMENT TOPICAL at 10:05

## 2024-05-08 RX ADMIN — AZITHROMYCIN MONOHYDRATE 500 MG: 500 INJECTION, POWDER, LYOPHILIZED, FOR SOLUTION INTRAVENOUS at 12:05

## 2024-05-08 RX ADMIN — ENOXAPARIN SODIUM 40 MG: 40 INJECTION SUBCUTANEOUS at 04:05

## 2024-05-08 RX ADMIN — OXYCODONE HYDROCHLORIDE AND ACETAMINOPHEN 1 TABLET: 5; 325 TABLET ORAL at 02:05

## 2024-05-08 RX ADMIN — ACETAMINOPHEN 650 MG: 325 TABLET ORAL at 06:05

## 2024-05-08 RX ADMIN — DOCUSATE SODIUM 200 MG: 100 CAPSULE, LIQUID FILLED ORAL at 09:05

## 2024-05-08 RX ADMIN — KETOROLAC TROMETHAMINE 30 MG: 30 INJECTION, SOLUTION INTRAMUSCULAR; INTRAVENOUS at 06:05

## 2024-05-08 RX ADMIN — CEFAZOLIN 2 G: 2 INJECTION, POWDER, FOR SOLUTION INTRAMUSCULAR; INTRAVENOUS at 04:05

## 2024-05-08 RX ADMIN — OXYCODONE 10 MG: 5 TABLET ORAL at 12:05

## 2024-05-08 RX ADMIN — DOCUSATE SODIUM 200 MG: 100 CAPSULE, LIQUID FILLED ORAL at 10:05

## 2024-05-08 RX ADMIN — OXYCODONE 10 MG: 5 TABLET ORAL at 04:05

## 2024-05-08 NOTE — DISCHARGE SUMMARY
[facility] -  Labor and Delivery  Obstetrics  Discharge Summary      Patient Name: Jaida Zacarias  MRN: [id number]  Admission Date: 2024  Hospital Length of Stay: 3 days  Discharge Date and Time:  2024 12:11 PM  Attending Physician: David Donovan DO   Discharging Provider: Kulwant Azul CNM  Primary Care Provider: David Donovan DO    HPI: Pt is awake and alert with no s/s distress.  Tolerating ambulation and regular diet.  Pain is well controlled with prn meds.  Voiding and passing gas.  Abd soft, non-tender. Abd incision well approximated with no s/s infection. Small amount lochia rubra. No edema lower extrem.      Procedure(s) (LRB):   SECTION (N/A)     Hospital Course: PCS on 24 for arrest of descent and fetal distress, per Dr Donovan.  Postpartum course has been uncomplicated.  Infant is doing well and expected to be discharged home with mom.       Consults (From admission, onward)          Status Ordering Provider     Inpatient consult to Anesthesiology  Once        Provider:  (Not yet assigned)    Acknowledged KULWANT AZUL            Final Active Diagnoses:    Diagnosis Date Noted POA    PRINCIPAL PROBLEM:  Delivery of pregnancy by  section [O82] 2024 No    Post-term pregnancy, 40-42 weeks of gestation [O48.0] 2024 Yes    Obesity affecting pregnancy in third trimester [O99.213] 2024 Yes    Fetal distress affecting management of mother, delivered [O77.9] 2024 No    Arrest of descent, delivered, current hospitalization [O62.1] 2024 No      Problems Resolved During this Admission:    Diagnosis Date Noted Date Resolved POA    Encounter for induction of labor [Z34.90] 2024 Not Applicable    41 weeks gestation of pregnancy [O48.0, Z3A.41] 2024 Yes        Labs: CBC   Recent Labs   Lab 24  0537   WBC 18.14*   HGB 11.1*   HCT 34.4*          Feeding Method:  "breast    Immunizations       Date Immunization Status Dose Route/Site Given by    24 001 MMR Incomplete 0.5 mL Subcutaneous/     24 Tdap Incomplete 0.5 mL Intramuscular/             Delivery:    Episiotomy:     Lacerations:     Repair suture:     Repair # of packets:     Blood loss (ml):       Birth information:  YOB: 2026   Time of birth: 10:17 PM   Sex: male   Delivery type: , Low Transverse   Gestational Age: 41w5d     Measurements    Weight: 4277 g  Weight (lbs): 9 lb 6.9 oz  Length: 50.8 cm  Length (in): 20"         Delivery Clinician: Delivery Providers    Delivering clinician: David Donovan DO   Provider Role    Mirlande Azul CNM              Additional  information:  Forceps:    Vacuum:    Breech:    Observed anomalies      Living?:     Apgars    Living status: Living  Apgar Component Scores:  1 min.:  5 min.:  10 min.:  15 min.:  20 min.:    Skin color:  0  1       Heart rate:  2  2       Reflex irritability:  2  2       Muscle tone:  1  2       Respiratory effort:  2  2       Total:  7  9       Apgars assigned by: MARTHA ZUNIGA         Placenta: Delivered:       appearance  Pending Diagnostic Studies:       None            Discharged Condition: good    Disposition: Home or Self Care    Follow Up:   Follow-up Information       Mirlande Azul CNM Follow up in 1 week(s).    Specialty: Obstetrics and Gynecology  Why: Incision Check  Contact information:  [address] [facility] [address]  [phone]                           Patient Instructions:      Pelvic Rest   Order Comments: 6 weeks     No driving until:   Order Comments: 2 weeks     Lifting restrictions   Order Comments: 10 pounds     Notify your health care provider if you experience any of the following:  increased confusion or weakness     Notify your health care provider if you experience any of the following:  persistent dizziness, light-headedness, or visual disturbances     Notify your health " care provider if you experience any of the following:  worsening rash     Notify your health care provider if you experience any of the following:  severe persistent headache     Notify your health care provider if you experience any of the following:  difficulty breathing or increased cough     Notify your health care provider if you experience any of the following:  redness, tenderness, or signs of infection (pain, swelling, redness, odor or green/yellow discharge around incision site)     Notify your health care provider if you experience any of the following:  severe uncontrolled pain     Notify your health care provider if you experience any of the following:  persistent nausea and vomiting or diarrhea     Notify your health care provider if you experience any of the following:  temperature >100.4     Medications:  Current Discharge Medication List        START taking these medications    Details   docusate sodium (COLACE) 100 MG capsule Take 2 capsules (200 mg total) by mouth 2 (two) times daily.  Qty: 30 capsule, Refills: 0      enoxaparin (LOVENOX) 40 mg/0.4 mL Syrg Inject 0.4 mLs (40 mg total) into the skin Daily.  Qty: 12 mL, Refills: 0      ibuprofen (ADVIL,MOTRIN) 800 MG tablet Take 1 tablet (800 mg total) by mouth every 8 (eight) hours as needed for Pain.  Qty: 30 tablet, Refills: 0      oxyCODONE-acetaminophen (PERCOCET) 5-325 mg per tablet Take 1 tablet by mouth every 4 (four) hours as needed for Pain.  Qty: 20 each, Refills: 0    Comments: n/a            STOP taking these medications       lisdexamfetamine (VYVANSE) 60 MG capsule Comments:   Reason for Stopping:         terconazole (TERAZOL 3) 0.8 % vaginal cream Comments:   Reason for Stopping:         tiZANidine (ZANAFLEX) 4 MG tablet Comments:   Reason for Stopping:               Mirlande Azul CNM  Obstetrics  [facility] -  Labor and Delivery

## 2024-05-08 NOTE — H&P
[facility] -  Labor and Delivery  Obstetrics  History & Physical    Patient Name: Jaida Zacarias  MRN: [id number]  Admission Date: 2024  Primary Care Provider: David Donovan DO    Subjective:     Principal Problem:Post-term pregnancy, 40-42 weeks of gestation    History of Present Illness: Admitted on 24 at 8pm for cervical ripening with Cytotec.  Pt has rec'd 2 doses of cytotec.  She is doing well with no acute distress at this time.  Reports good fetal movements.  Plans to attempt pain management without anesthesia at this time.      Pt has had adequate PN care  GBS negative    This pregnancy has been complicated by obesity, postdates    OB History    Para Term  AB Living   1 0 0 0 0 0   SAB IAB Ectopic Multiple Live Births   0 0 0 0 0      # Outcome Date GA Lbr Saad/2nd Weight Sex Type Anes PTL Lv   1 Current              History reviewed. No pertinent past medical history.  History reviewed. No pertinent surgical history.    PTA Medications   Medication Sig    lisdexamfetamine (VYVANSE) 60 MG capsule Take 60 mg by mouth every morning. (Patient not taking: Reported on 2024)    terconazole (TERAZOL 3) 0.8 % vaginal cream Place 1 applicator vaginally every evening. (Patient not taking: Reported on 4/3/2024)    tiZANidine (ZANAFLEX) 4 MG tablet 1 or 2 3 times a day as needed for muscle spasm (Patient not taking: Reported on 4/3/2024)       Review of patient's allergies indicates:  No Known Allergies     Family History       Problem Relation (Age of Onset)    Hypertension Mother    Stroke Maternal Grandfather          Tobacco Use    Smoking status: Never    Smokeless tobacco: Never   Substance and Sexual Activity    Alcohol use: Never    Drug use: Never    Sexual activity: Yes     Partners: Male     Birth control/protection: None     Review of Systems   Constitutional:  Negative for fever.   Respiratory:  Negative for shortness of breath.    Cardiovascular:  Negative  for leg swelling.   Gastrointestinal:  Positive for abdominal pain. Negative for nausea and vomiting.   Genitourinary:  Negative for vaginal bleeding.   Musculoskeletal:  Positive for back pain.   Neurological:  Negative for headaches.      Objective:     Vital Signs (Most Recent):  Temp: 97.6 °F (36.4 °C) (05/07/24 1611)  Pulse: 65 (05/07/24 1857)  Resp: 18 (05/07/24 1757)  BP: 127/73 (05/07/24 1855)  SpO2: 100 % (05/07/24 1857) Vital Signs (24h Range):  Temp:  [97.6 °F (36.4 °C)-98.2 °F (36.8 °C)] 97.6 °F (36.4 °C)  Pulse:  [] 65  Resp:  [16-18] 18  SpO2:  [97 %-100 %] 100 %  BP: ()/() 127/73     Weight: (!) 140.6 kg (310 lb)  Body mass index is 53.21 kg/m².    FHT: 130 Cat 1 (reassuring)  TOCO:  Q 2-3 minutes    Physical Exam:   Constitutional: She is oriented to person, place, and time. She appears well-developed and well-nourished. No distress.    HENT:   Head: Normocephalic.      Cardiovascular:  Normal rate and regular rhythm.      Exam reveals no edema.        Pulmonary/Chest: Effort normal and breath sounds normal.        Abdominal: Soft.   Gravid, non-tender             Musculoskeletal: Moves all extremeties.       Neurological: She is alert and oriented to person, place, and time. She has normal reflexes.    Skin: Skin is warm and dry.    Psychiatric: She has a normal mood and affect.       Cervix:  Dilation:  2  Effacement:  70%  Station: -3  Presentation: Vertex   AROM done with moderate meconium stained fluid noted, no odor    Significant Labs:  Lab Results   Component Value Date    GROUPTRH A POS 05/06/2024    HEPBSAG Non-Reactive 05/06/2024       CBC:   Recent Labs   Lab 05/06/24 2040   WBC 11.75*   RBC 4.42   HGB 12.1   HCT 37.4*      MCV 84.6   MCH 27.4   MCHC 32.4     CMP:   Recent Labs   Lab 05/06/24 2040   GLU 96   CALCIUM 9.0   ALBUMIN 2.4*   PROT 6.1*      K 3.9   CO2 19*   *   BUN 6*   CREATININE 0.54*   ALKPHOS 210*   ALT 19   AST 22   BILITOT 0.5     I  have personallly reviewed all pertinent lab results from the last 24 hours.    Assessment/Plan:     24 y.o. female  at 41w5d for:    Active Diagnoses:    Diagnosis Date Noted POA    PRINCIPAL PROBLEM:  Post-term pregnancy, 40-42 weeks of gestation [O48.0] 2024 Yes    Encounter for induction of labor [Z34.90] 2024 Not Applicable    Obesity affecting pregnancy in third trimester [O99.213] 2024 Yes    41 weeks gestation of pregnancy [O48.0, Z3A.41] 2024 Yes      Problems Resolved During this Admission:       Continue current mgt  Pitocin augmentation per protocol if needed.   Epidural if pt desires  Dr Donovan aware of pt status and in agreement with ERVIN Azul CNM  Obstetrics  [facility] -  Labor and Delivery

## 2024-05-08 NOTE — PROGRESS NOTES
In room to assess and pt on left side with right leg up in stirrups  , minimal variability with subtle late decelerations noted, Cat 2 tracing currently  Ucs 2-3min  SVE done with no changes noted and fetal station remains at -2 with molding/caput noted  Pitocin infusion discontinued per RN  Pt repositioned to right side  Discussed with pt concerns and recommendation for  delivery due to poor progress and late decelerations  Discussed risks associated with  including infection, bleeding and damage to adjacent organs and pt, sign other and her mother verb understanding and wish to proceed.  Dr Donovan updated on pt status and in agreement with POC  Anesthesia and nursery notified per RN.

## 2024-05-08 NOTE — L&D DELIVERY NOTE
[facility] -  Labor and Delivery   Section   Operative Note    SUMMARY     Date of Procedure: 2024     Procedure: Procedure(s) (LRB):   SECTION (N/A)    Surgeons and Role:     * David Donovan DO - Primary    Assistant: Mirlande Azul CNM    Pre-Operative Diagnosis: 41 week IUP, Fetal Intolerance of Labor, Failure to progress    Post-Operative Diagnosis: Same    Anesthesia: Epidural    Technical Procedures Used: PLTCS           Description of the Findings of the Procedure: viable male  in cephalic presentation, meconium stained fluid    Significant Surgical Tasks Conducted by the Assistant(s), if Applicable: retraction    Complications: No    Blood Loss: see below     Patient was taken to OR with IVF running. Spinal anesthesia was placed without difficulty.  With patient in supine position, the legs are  and Tay Catheter placed and positioning to supine done.   Abdomen prepped with Chloroprep and 3 minute drying time allowed prior to draping of the abdomen.   Time out taken with OR team members.    Pfannenstiel Incision made through the skin, transverse fascial incision developed, rectus muscles  in the midline and the peritoneum entered.   no adhesions noted.    Judson retractor was inserted into the abdominal cavity.  The lower uterine segment and position of the fetus identified.   Bladder flap taken down through transverse peritoneal incision.    Low Transverse Incision made through well developed lower uterine segment and extended laterally with blunt dissection.   Thin meconium fluid noted.  Infant delivered from vertex presentation.  Cord clamped after one minute and  handed to attending nurse.  Cord blood taken, placenta delivered.  The uterus was cleared of all clot and debris.  The edges of the uterine incision are grasped with ring clamps at the angles and the inferior midline edge of the incision.    Closure with running lock 0  "monocryl, starting at left angle, tying at right angle.  A second imbricating layer was placed with 0 monocryl.   Observation for bleeding with suture of any bleeding along the hysterotomy line.   Uterine atony was noted, patient received methergine, TXA and 40u pitocin.  With good hemostasis noted, the anterior pelvis is rinsed with sterile saline.     Judson retractor was removed from the abdomen.   Closure of the abdomen with 3-0 monocryl running of the peritoneum.  Rectus muscle was reapproximated with 0 chromic.  Fascia was closed with 0 looped PDS starting at the left angle and tying the knot at the right angle.  Subcutaneous tissue was reapproximated with 2-0 plain gut.  Skin closure with 3-0 monocryl subcuticular.  Wound dressed with glue, pressure dressing.            Specimens:   Specimen (24h ago, onward)      None            Condition: Good    VTE Risk Mitigation (From admission, onward)           Ordered     enoxaparin injection 40 mg  Every 24 hours         244     IP VTE LOW RISK PATIENT  Once         24                    Disposition: PACU - hemodynamically stable.    Attestation: Good         Delivery Information for Kobe Zacarias    Birth information:  YOB: 2026   Time of birth: 10:17 PM   Sex: male   Head Delivery Date/Time: 2024 10:17 PM   Delivery type: , Low Transverse   Gestational Age: 41w5d        Delivery Providers    Delivering clinician: David Donovan DO   Provider Role    Mirlande Azul CNM               Measurements    Weight: 4277 g  Weight (lbs): 9 lb 6.9 oz  Length: 50.8 cm  Length (in): 20"         Apgars    Living status: Living  Apgar Component Scores:  1 min.:  5 min.:  10 min.:  15 min.:  20 min.:    Skin color:  0  1       Heart rate:  2  2       Reflex irritability:  2  2       Muscle tone:  1  2       Respiratory effort:  2  2       Total:  7  9       Apgars assigned by: MARTHA ZUNIGA         Operative Delivery  "   Forceps attempted?: No  Vacuum extractor attempted?: No         Shoulder Dystocia    Shoulder dystocia present?: No           Presentation    Presentation: Vertex           Interventions/Resuscitation    Method: Bulb Suctioning, Tactile Stimulation, Blow By Oxygen       Cord    Vessels: 3 vessels  Complications: None  Delayed Cord Clamping?: Yes  Gases Sent?: No  Stem Cell Collection (by MD): No       Placenta    Placenta delivery date/time: 2024 2220  Placenta removal: Manual removal  Placenta appearance: Intact  Placenta disposition: Discarded           Labor Events:       labor: No     Labor Onset Date/Time: 2024 08:10     Dilation Complete Date/Time:         Start Pushing Date/Time:       Rupture Date/Time: 24  0810         Rupture type: ARM (Artificial Rupture)         Fluid Amount:       Fluid Color: Meconium Moderate       steroids: Unknown     Antibiotics given for GBS: No     Induction: misoprostol;oxytocin     Indications for induction:  Post-term Gestation     Augmentation: oxytocin     Indications for augmentation: Ineffective Contraction Pattern     Labor complications: Failure to Progress in First Stage;Fetal Intolerance     Additional complications:          Cervical ripening:                     Delivery:      Episiotomy:       Indication for Episiotomy:       Perineal Lacerations:   Repaired:      Periurethral Laceration:   Repaired:     Labial Laceration:   Repaired:     Sulcus Laceration:   Repaired:     Vaginal Laceration:   Repaired:     Cervical Laceration:   Repaired:     Repair suture:       Repair # of packets:       Last Value - EBL - Nursing (mL):       Sum - EBL - Nursing (mL): 0     Last Value - EBL - Anesthesia (mL):      Calculated QBL (mL): 978      Running total QBL (mL): 978      Vaginal Sweep Performed:       Surgicount Correct:         Other providers:       Anesthesia    Method: Epidural          Details (if applicable):  Trial of Labor  Yes    Categorization: Primary    Priority: Urgent   Indications for : Fetal heart rate abnormalities   Incision Type: low transverse     Additional  information:  Forceps:    Vacuum:    Breech:    Observed anomalies    Other (Comments):

## 2024-05-08 NOTE — PROGRESS NOTES
[facility] -  Labor and Delivery  Obstetrics  Labor Progress Note    Patient Name: Jaida Zacarias  MRN: [id number]  Admission Date: 2024  Hospital Length of Stay: 1 days  Attending Physician: David Donovan DO  Primary Care Provider: David Donovan DO    Subjective:     Principal Problem:Post-term pregnancy, 40-42 weeks of gestation    Interval History:  Jaida is a 24 y.o.  at 41w5d. She is doing well. Tolerating contractions well.  Pitocin was started per protocol.     Objective:     Vital Signs (Most Recent):  Temp: 97.6 °F (36.4 °C) (24 1611)  Pulse: 65 (24 1857)  Resp: 18 (24 1757)  BP: 127/73 (24 185)  SpO2: 100 % (24) Vital Signs (24h Range):  Temp:  [97.6 °F (36.4 °C)-98.2 °F (36.8 °C)] 97.6 °F (36.4 °C)  Pulse:  [] 65  Resp:  [16-18] 18  SpO2:  [97 %-100 %] 100 %  BP: ()/() 127/73     Weight: (!) 140.6 kg (310 lb)  Body mass index is 53.21 kg/m².    FHT: 120 Cat 1 (reassuring)  TOCO:  Q 2-4 minutes    Physical Exam:   Constitutional: She is oriented to person, place, and time. She appears well-developed and well-nourished. No distress.    HENT:   Head: Normocephalic.      Cardiovascular:  Normal rate and regular rhythm.      Exam reveals no edema.        Pulmonary/Chest: Effort normal and breath sounds normal.        Abdominal: Soft.   Gravid, non-tender             Musculoskeletal: Moves all extremeties.       Neurological: She is alert and oriented to person, place, and time. She has normal reflexes.    Skin: Skin is warm and dry.    Psychiatric: She has a normal mood and affect.       Cervical Exam:  Dilation:  3  Effacement:  70%  Station: -3  Presentation: Vertex   IUPC inserted without difficulty    Significant Labs:  Lab Results   Component Value Date    GROUPTRH A POS 2024    HEPBSAG Non-Reactive 2024       CBC:   Recent Labs   Lab 24   WBC 11.75*   RBC 4.42   HGB 12.1   HCT 37.4*       MCV 84.6   MCH 27.4   MCHC 32.4     I have personallly reviewed all pertinent lab results from the last 24 hours.    Assessment/Plan:     24 y.o. female  at 41w5d for:    Active Diagnoses:    Diagnosis Date Noted POA    PRINCIPAL PROBLEM:  Post-term pregnancy, 40-42 weeks of gestation [O48.0] 2024 Yes    Encounter for induction of labor [Z34.90] 2024 Not Applicable    Obesity affecting pregnancy in third trimester [O99.213] 2024 Yes    41 weeks gestation of pregnancy [O48.0, Z3A.41] 2024 Yes      Problems Resolved During this Admission:       IUPC inserted   FSE if needed  Continue current mgt    Mirlande Azul CNM  Obstetrics  [facility] -  Labor and Delivery

## 2024-05-08 NOTE — LACTATION NOTE
This note was copied from a baby's chart.  Breastfeeding rounds done, mom reports infant latching well, assisted mom with positioning,  mom denies questions or concerns, mom to call with any needs

## 2024-05-08 NOTE — ANESTHESIA POSTPROCEDURE EVALUATION
Anesthesia Post Evaluation    Patient: Jaida Zacarias    Procedure(s) Performed: Procedure(s) (LRB):   SECTION (N/A)    Final Anesthesia Type: epidural      Patient location during evaluation: labor & delivery  Patient participation: Yes- Able to Participate  Level of consciousness: awake and alert and oriented  Post-procedure vital signs: reviewed and stable  Pain management: adequate  Airway patency: patent  AMY mitigation strategies: Multimodal analgesia and Use of major conduction anesthesia (spinal/epidural) or peripheral nerve block  PONV status at discharge: No PONV  Anesthetic complications: no      Cardiovascular status: hemodynamically stable  Respiratory status: unassisted and spontaneous ventilation  Hydration status: euvolemic  Follow-up not needed.              Vitals Value Taken Time   /96 24 2304   Temp 36.1 °C (97 °F) 24 2304   Pulse 85 24 2304   Resp 20 24 2304   SpO2 98 % 24         No case tracking events are documented in the log.      Pain/Imelda Score: Pain Rating Prior to Med Admin: 4 (2024  7:47 PM)  Pain Rating Post Med Admin: 5 (2024  4:29 PM)

## 2024-05-08 NOTE — PROGRESS NOTES
[facility] -  Labor and Delivery  Obstetrics  Postpartum Progress Note    Patient Name: Jaida Zacarias  MRN: [id number]  Admission Date: 5/6/2024  Hospital Length of Stay: 2 days  Attending Physician: David Donovan DO  Primary Care Provider: David Donovan DO    Subjective:     Principal Problem:Post-term pregnancy, 40-42 weeks of gestation    Hospital course: PCS on 5/7/24 per Dr Donovan for fetal distress and arrest of descent    Interval History: Postop Day 1    She is doing well this morning. She is tolerating a regular diet without nausea or vomiting. She is not voiding spontaneously, lares cath in place. She is not ambulating. She has passed flatus, and has not a BM. Vaginal bleeding is mild. She denies fever or chills. Abdominal pain is moderate and controlled with oral medications. She Is breastfeeding. She desires circumcision for her male baby: yes.     Objective:     Vital Signs (Most Recent):  Temp: 98.6 °F (37 °C) (05/08/24 0743)  Pulse: 82 (05/08/24 0743)  Resp: 20 (05/08/24 0743)  BP: (!) 103/56 (05/08/24 0743)  SpO2: 96 % (05/08/24 0620) Vital Signs (24h Range):  Temp:  [97 °F (36.1 °C)-98.6 °F (37 °C)] 98.6 °F (37 °C)  Pulse:  [] 82  Resp:  [18-20] 20  SpO2:  [80 %-100 %] 96 %  BP: ()/() 103/56     Weight: (!) 140.6 kg (310 lb)  Body mass index is 53.21 kg/m².      Intake/Output Summary (Last 24 hours) at 5/8/2024 0930  Last data filed at 5/8/2024 0614  Gross per 24 hour   Intake 100 ml   Output 1578 ml   Net -1478 ml       Physical Exam:   Constitutional: She is oriented to person, place, and time. She appears well-developed.    HENT:   Head: Normocephalic.      Cardiovascular:  Normal rate and regular rhythm.             Pulmonary/Chest: Effort normal and breath sounds normal.        Abdominal: Soft. Bowel sounds are normal. She exhibits abdominal incision (well-approximated, no s/s infection). She exhibits no distension. There is no abdominal  tenderness.             Musculoskeletal: Edema (1+ lower extrem) present.       Neurological: She is alert and oriented to person, place, and time.    Skin: Skin is warm and dry.    Psychiatric: She has a normal mood and affect.       Significant Labs:  Lab Results   Component Value Date    GROUPTRH A POS 2024    HEPBSAG Non-Reactive 2024     Recent Labs   Lab 24  0537   HGB 11.1*   HCT 34.4*       CBC:   Recent Labs   Lab 24  0537   WBC 18.14*   RBC 4.03*   HGB 11.1*   HCT 34.4*      MCV 85.4   MCH 27.5   MCHC 32.3     I have personallly reviewed all pertinent lab results from the last 24 hours.    Assessment/Plan:     24 y.o. female  at 41w5d for:    Active Diagnoses:    Diagnosis Date Noted POA    PRINCIPAL PROBLEM:  Post-term pregnancy, 40-42 weeks of gestation [O48.0] 2024 Yes    Encounter for induction of labor [Z34.90] 2024 Not Applicable    Obesity affecting pregnancy in third trimester [O99.213] 2024 Yes    Delivery of pregnancy by  section [O82] 2024 Unknown    Fetal distress affecting management of mother, delivered [O77.9] 2024 Unknown    Arrest of descent, delivered, current hospitalization [O62.1] 2024 Unknown    41 weeks gestation of pregnancy [O48.0, Z3A.41] 2024 Yes      Problems Resolved During this Admission:       Routine postop care  D/C lares cath  Increase ambulation    Disposition: As patient meets milestones, will plan to discharge tomorrow afternoon or Friday.    Mirlande Azul CNM  Obstetrics  [facility] -  Labor and Delivery

## 2024-05-08 NOTE — PROGRESS NOTES
Epidural in place and pt is comfortable  , moderate variability, occasional mild variable decels with good return to baseline, Cat 2 tracing  Ucs 2-4min, 60-90sec, 50-70mmHG intensity with 15-20mmHg baseline  SVE done. Cx 5cm, 80%, -2 station with small amount molding/caput noted.  Pitocin infusing per protocol  Repositioned pt to extreme right side with left leg up in stirrup    Plan: Position changes, re-evaluate in 1-2 hours for labor progress    Dr Donovan updated on pt status and in agreement with POC

## 2024-05-09 VITALS
WEIGHT: 293 LBS | RESPIRATION RATE: 20 BRPM | OXYGEN SATURATION: 98 % | SYSTOLIC BLOOD PRESSURE: 132 MMHG | HEART RATE: 88 BPM | HEIGHT: 64 IN | TEMPERATURE: 98 F | DIASTOLIC BLOOD PRESSURE: 69 MMHG | BODY MASS INDEX: 50.02 KG/M2

## 2024-05-09 PROCEDURE — 25000003 PHARM REV CODE 250: Performed by: ADVANCED PRACTICE MIDWIFE

## 2024-05-09 PROCEDURE — 25000003 PHARM REV CODE 250: Performed by: STUDENT IN AN ORGANIZED HEALTH CARE EDUCATION/TRAINING PROGRAM

## 2024-05-09 PROCEDURE — 63600175 PHARM REV CODE 636 W HCPCS: Performed by: ADVANCED PRACTICE MIDWIFE

## 2024-05-09 RX ADMIN — IBUPROFEN 800 MG: 800 TABLET, FILM COATED ORAL at 01:05

## 2024-05-09 RX ADMIN — Medication 1 TABLET: at 09:05

## 2024-05-09 RX ADMIN — ENOXAPARIN SODIUM 40 MG: 40 INJECTION SUBCUTANEOUS at 01:05

## 2024-05-09 RX ADMIN — OXYCODONE HYDROCHLORIDE AND ACETAMINOPHEN 1 TABLET: 5; 325 TABLET ORAL at 01:05

## 2024-05-09 RX ADMIN — DOCUSATE SODIUM 200 MG: 100 CAPSULE, LIQUID FILLED ORAL at 09:05

## 2024-05-09 RX ADMIN — OXYCODONE AND ACETAMINOPHEN 1 TABLET: 10; 325 TABLET ORAL at 09:05

## 2024-05-09 RX ADMIN — OXYCODONE AND ACETAMINOPHEN 1 TABLET: 10; 325 TABLET ORAL at 01:05

## 2024-05-09 NOTE — LACTATION NOTE
This note was copied from a baby's chart.  Breastfeeding rounds done, mom reports infant latching well, mom denies questions or concerns, mom to call with any needs

## 2024-05-09 NOTE — PLAN OF CARE
Problem: Adult Inpatient Plan of Care  Goal: Plan of Care Review  Outcome: Progressing  Goal: Patient-Specific Goal (Individualized)  Outcome: Progressing  Goal: Absence of Hospital-Acquired Illness or Injury  Outcome: Progressing  Goal: Optimal Comfort and Wellbeing  Outcome: Progressing  Goal: Readiness for Transition of Care  Outcome: Progressing     Problem: Bariatric Environmental Safety  Goal: Safety Maintained with Care  Outcome: Progressing     Problem:  Fall Injury Risk  Goal: Absence of Fall, Infant Drop and Related Injury  Outcome: Progressing     Problem: Infection  Goal: Absence of Infection Signs and Symptoms  Outcome: Progressing     Problem: Labor  Goal: Hemostasis  Outcome: Progressing  Goal: Stable Fetal Wellbeing  Outcome: Progressing  Goal: Effective Progression to Delivery  Outcome: Progressing  Goal: Absence of Infection Signs and Symptoms  Outcome: Progressing  Goal: Acceptable Pain Control  Outcome: Progressing  Goal: Normal Uterine Contraction Pattern  Outcome: Progressing     Problem: Wound  Goal: Optimal Coping  Outcome: Progressing  Goal: Optimal Functional Ability  Outcome: Progressing  Goal: Absence of Infection Signs and Symptoms  Outcome: Progressing  Goal: Improved Oral Intake  Outcome: Progressing  Goal: Optimal Pain Control and Function  Outcome: Progressing  Goal: Skin Health and Integrity  Outcome: Progressing  Goal: Optimal Wound Healing  Outcome: Progressing     Problem: Postpartum ( Delivery)  Goal: Successful Parent Role Transition  Outcome: Progressing  Goal: Hemostasis  Outcome: Progressing  Goal: Effective Bowel Elimination  Outcome: Progressing  Goal: Fluid and Electrolyte Balance  Outcome: Progressing  Goal: Absence of Infection Signs and Symptoms  Outcome: Progressing  Goal: Anesthesia/Sedation Recovery  Outcome: Progressing  Goal: Optimal Pain Control and Function  Outcome: Progressing  Goal: Nausea and Vomiting Relief  Outcome: Progressing  Goal:  Effective Urinary Elimination  Outcome: Progressing  Goal: Effective Oxygenation and Ventilation  Outcome: Progressing

## 2024-05-09 NOTE — PLAN OF CARE
Problem: Adult Inpatient Plan of Care  Goal: Plan of Care Review  Outcome: Met  Goal: Patient-Specific Goal (Individualized)  Outcome: Met  Goal: Absence of Hospital-Acquired Illness or Injury  Outcome: Met  Goal: Optimal Comfort and Wellbeing  Outcome: Met  Goal: Readiness for Transition of Care  Outcome: Met     Problem: Bariatric Environmental Safety  Goal: Safety Maintained with Care  Outcome: Met     Problem:  Fall Injury Risk  Goal: Absence of Fall, Infant Drop and Related Injury  Outcome: Met     Problem: Infection  Goal: Absence of Infection Signs and Symptoms  Outcome: Met     Problem: Labor  Goal: Hemostasis  Outcome: Met  Goal: Stable Fetal Wellbeing  Outcome: Met  Goal: Effective Progression to Delivery  Outcome: Met  Goal: Absence of Infection Signs and Symptoms  Outcome: Met  Goal: Acceptable Pain Control  Outcome: Met  Goal: Normal Uterine Contraction Pattern  Outcome: Met     Problem: Wound  Goal: Optimal Coping  Outcome: Met  Goal: Optimal Functional Ability  Outcome: Met  Goal: Absence of Infection Signs and Symptoms  Outcome: Met  Goal: Improved Oral Intake  Outcome: Met  Goal: Optimal Pain Control and Function  Outcome: Met  Goal: Skin Health and Integrity  Outcome: Met  Goal: Optimal Wound Healing  Outcome: Met     Problem: Postpartum ( Delivery)  Goal: Successful Parent Role Transition  Outcome: Met  Goal: Hemostasis  Outcome: Met  Goal: Effective Bowel Elimination  Outcome: Met  Goal: Fluid and Electrolyte Balance  Outcome: Met  Goal: Absence of Infection Signs and Symptoms  Outcome: Met  Goal: Anesthesia/Sedation Recovery  Outcome: Met  Goal: Optimal Pain Control and Function  Outcome: Met  Goal: Nausea and Vomiting Relief  Outcome: Met  Goal: Effective Urinary Elimination  Outcome: Met  Goal: Effective Oxygenation and Ventilation  Outcome: Met

## 2024-05-16 ENCOUNTER — POSTPARTUM VISIT (OUTPATIENT)
Dept: OBSTETRICS AND GYNECOLOGY | Facility: CLINIC | Age: 24
End: 2024-05-16
Payer: MEDICAID

## 2024-05-16 VITALS
OXYGEN SATURATION: 100 % | SYSTOLIC BLOOD PRESSURE: 120 MMHG | WEIGHT: 293 LBS | HEIGHT: 64 IN | DIASTOLIC BLOOD PRESSURE: 80 MMHG | RESPIRATION RATE: 20 BRPM | BODY MASS INDEX: 50.02 KG/M2 | HEART RATE: 102 BPM

## 2024-05-16 DIAGNOSIS — Z98.891 STATUS POST CESAREAN SECTION: ICD-10-CM

## 2024-05-16 DIAGNOSIS — Z09 POSTOP CHECK: Primary | ICD-10-CM

## 2024-05-16 PROCEDURE — 0503F POSTPARTUM CARE VISIT: CPT | Mod: ,,, | Performed by: ADVANCED PRACTICE MIDWIFE

## 2024-05-16 PROCEDURE — 99214 OFFICE O/P EST MOD 30 MIN: CPT | Mod: PBBFAC | Performed by: ADVANCED PRACTICE MIDWIFE

## 2024-05-16 NOTE — PROGRESS NOTES
"Subjective:       Jaida Zacarias is a 24 y.o. female who presents to the clinic 1 weeks status post  for  failure to progress, fetal intolerance  . Eating a regular diet without difficulty. Voiding without difficulty. Bowel movements are normal.  Pain controlled with medication.   Breast feeding.  Denies depression/anxiety.    The following portions of the patient's history were reviewed and updated as appropriate: allergies, current medications, past family history, past medical history, past social history, past surgical history, and problem list.    Review of Systems  Pertinent items are noted in HPI.      Objective:      /80 (BP Location: Left arm, Patient Position: Sitting)   Pulse 102   Resp 20   Ht 5' 4" (1.626 m)   Wt 133.8 kg (295 lb)   LMP 2023   SpO2 100%   Breastfeeding Yes   BMI 50.64 kg/m²   General:  alert, appears stated age, and cooperative   Abdomen: soft, bowel sounds active, non-tender   Incision:   healing well, no drainage, no erythema, no hernia, no seroma, no swelling, no dehiscence, incision well approximated       Assessment:   Postop  Delivery   Lactating Mother      Plan:      1. Continue any current medications.  2. Wound care discussed.  3. Activity restrictions: no lifting more than 10-15 pounds  4. Anticipated return to work: not applicable.  5. Follow up: 5 weeks for postpartum visit.     "

## 2024-06-19 ENCOUNTER — POSTPARTUM VISIT (OUTPATIENT)
Dept: OBSTETRICS AND GYNECOLOGY | Facility: CLINIC | Age: 24
End: 2024-06-19
Payer: MEDICAID

## 2024-06-19 VITALS
HEIGHT: 64 IN | HEART RATE: 98 BPM | BODY MASS INDEX: 49.51 KG/M2 | DIASTOLIC BLOOD PRESSURE: 78 MMHG | WEIGHT: 290 LBS | RESPIRATION RATE: 19 BRPM | OXYGEN SATURATION: 100 % | SYSTOLIC BLOOD PRESSURE: 118 MMHG

## 2024-06-19 DIAGNOSIS — Z30.09 GENERAL COUNSELING AND ADVICE ON CONTRACEPTIVE MANAGEMENT: ICD-10-CM

## 2024-06-19 PROCEDURE — 99213 OFFICE O/P EST LOW 20 MIN: CPT | Mod: PBBFAC | Performed by: ADVANCED PRACTICE MIDWIFE

## 2024-06-19 PROCEDURE — 0503F POSTPARTUM CARE VISIT: CPT | Mod: CPTII,,, | Performed by: ADVANCED PRACTICE MIDWIFE

## 2024-06-19 PROCEDURE — 99999 PR PBB SHADOW E&M-EST. PATIENT-LVL III: CPT | Mod: PBBFAC,,, | Performed by: ADVANCED PRACTICE MIDWIFE

## 2024-06-19 NOTE — PROGRESS NOTES
"Subjective:       Jaida Zacarias is a 24 y.o. female who presents for a postpartum visit. She is 6 weeks postpartum following a low cervical transverse  section. I have fully reviewed the prenatal and intrapartum course. The delivery was at 41 gestational weeks. Outcome: primary  section, low transverse incision. Anesthesia: epidural. Postpartum course has been uncomplicated. Baby's course has been uncomplicated. Baby is feeding by breast. Bleeding thin lochia. Bowel function is normal. Bladder function is normal. Patient is not sexually active. Contraception method is abstinence. Postpartum depression screening: negative. Last pap with  in .     The following portions of the patient's history were reviewed and updated as appropriate: allergies, current medications, past family history, past medical history, past social history, past surgical history, and problem list.    Review of Systems  Pertinent items are noted in HPI.     Objective:      /78 (BP Location: Right arm, Patient Position: Sitting)   Pulse 98   Resp 19   Ht 5' 4" (1.626 m)   Wt 131.5 kg (290 lb)   LMP 2023   SpO2 100%   Breastfeeding Yes   BMI 49.78 kg/m²    General:  alert, appears stated age, and cooperative    Breasts:  deferred   Lungs: clear to auscultation bilaterally   Heart:  regular rate and rhythm   Abdomen: soft, non-tender; bowel sounds normal; no masses,  no organomegaly                                  Assessment:   Post  Section   6 week postpartum exam. Pap smear not done at today's visit.   Lactating Mother  Contraception Discussion    Plan:      1. Contraception:  natural family planning and breast feeding.   2.  Follow up in: 1  year  or as needed.     "

## (undated) DEVICE — SUT CHROMIC 0 CT-1

## (undated) DEVICE — APPLICATOR CHLORAPREP TINTED ORANGE 26ML (FOR PAIN TX ORDERS

## (undated) DEVICE — SUT 2/0 27IN PLAIN GUT CT

## (undated) DEVICE — SUT COAT VICRYL 0 CTX 27IN

## (undated) DEVICE — SUTURE MONOCRYL 3-0 STRAIGHT 27 ABSORB Y523H"

## (undated) DEVICE — PACK C SECTION RUSH

## (undated) DEVICE — DRAPE L&D NON STERILE (ORDER 63957)

## (undated) DEVICE — PAD GROUND ELECTROD DISP VALLEY

## (undated) DEVICE — CLAMP CORD UMBILICAL STL

## (undated) DEVICE — SOL IRRIGATION SALINE 0.9% 1000ML BOTTLE

## (undated) DEVICE — SUT MONOCRYL 3-0 SH 27 UND"

## (undated) DEVICE — CANISTER SUCTION MEDIVAC RIGID 1200CC W/FIL